# Patient Record
Sex: FEMALE | Race: WHITE | NOT HISPANIC OR LATINO | Employment: OTHER | URBAN - METROPOLITAN AREA
[De-identification: names, ages, dates, MRNs, and addresses within clinical notes are randomized per-mention and may not be internally consistent; named-entity substitution may affect disease eponyms.]

---

## 2017-08-14 ENCOUNTER — TELEPHONE (OUTPATIENT)
Dept: ORTHOPEDICS | Facility: CLINIC | Age: 62
End: 2017-08-14

## 2017-08-14 NOTE — TELEPHONE ENCOUNTER
----- Message from Jacky Baker MD sent at 8/14/2017  8:33 AM CDT -----  Contact: self   I will be happy to see her.    ----- Message -----  From: Frieda Pederson MA  Sent: 8/14/2017   8:21 AM  To: Jacky Baker MD        ----- Message -----  From: Beena Messina MA  Sent: 8/14/2017   8:13 AM  To: Olivia RAMOS Staff, Bryson AYON Staff, #    Pt recentlt had sx back in may of this year on her hip. Her surgeon in [place] has told her that she will need another sx to shorten her leg as a corrective procedure.  Her surgeon has never done the procedure before that is why the pt is seeking out a Dr that has done this procedure. Pt can be reached at [phone].

## 2017-09-12 ENCOUNTER — TELEPHONE (OUTPATIENT)
Dept: ORTHOPEDICS | Facility: CLINIC | Age: 62
End: 2017-09-12

## 2017-09-12 DIAGNOSIS — M25.552 PAIN OF LEFT HIP JOINT: Primary | ICD-10-CM

## 2017-09-13 ENCOUNTER — HOSPITAL ENCOUNTER (OUTPATIENT)
Dept: RADIOLOGY | Facility: HOSPITAL | Age: 62
Discharge: HOME | End: 2017-09-13
Attending: ORTHOPAEDIC SURGERY
Payer: COMMERCIAL

## 2017-09-13 ENCOUNTER — OFFICE VISIT (OUTPATIENT)
Dept: ORTHOPEDICS | Facility: CLINIC | Age: 62
End: 2017-09-13
Payer: COMMERCIAL

## 2017-09-13 VITALS — BODY MASS INDEX: 29.67 KG/M2 | WEIGHT: 173.81 LBS | HEIGHT: 64 IN

## 2017-09-13 DIAGNOSIS — Z96.642 HISTORY OF LEFT HIP REPLACEMENT: ICD-10-CM

## 2017-09-13 DIAGNOSIS — M21.70 ACQUIRED UNEQUAL LIMB LENGTH: ICD-10-CM

## 2017-09-13 DIAGNOSIS — M25.552 PAIN OF LEFT HIP JOINT: Primary | ICD-10-CM

## 2017-09-13 DIAGNOSIS — M25.552 PAIN OF LEFT HIP JOINT: ICD-10-CM

## 2017-09-13 PROCEDURE — 3008F BODY MASS INDEX DOCD: CPT | Mod: S$GLB,,, | Performed by: ORTHOPAEDIC SURGERY

## 2017-09-13 PROCEDURE — 99203 OFFICE O/P NEW LOW 30 MIN: CPT | Mod: S$GLB,,, | Performed by: ORTHOPAEDIC SURGERY

## 2017-09-13 PROCEDURE — 73502 X-RAY EXAM HIP UNI 2-3 VIEWS: CPT | Mod: 26,LT,, | Performed by: RADIOLOGY

## 2017-09-13 PROCEDURE — 99999 PR PBB SHADOW E&M-EST. PATIENT-LVL III: CPT | Mod: PBBFAC,,, | Performed by: ORTHOPAEDIC SURGERY

## 2017-09-13 PROCEDURE — 73502 X-RAY EXAM HIP UNI 2-3 VIEWS: CPT | Mod: TC,LT

## 2017-09-13 RX ORDER — TRAMADOL HYDROCHLORIDE 50 MG/1
100 TABLET ORAL
COMMUNITY
Start: 2017-05-26 | End: 2017-10-30 | Stop reason: ALTCHOICE

## 2017-09-13 RX ORDER — ACETAMINOPHEN 500 MG
500 TABLET ORAL
COMMUNITY
End: 2018-03-19

## 2017-09-13 NOTE — PROGRESS NOTES
Subjective:      Patient ID: Monet Lewis is a 62 y.o. female.    Chief Complaint: Pain of the Left Knee and Pain of the Left Hip    HPI  Monet Lewis is a 62 year old female here with a several  month history of left hip pain. The patient is retired teacher. There was not a history of trauma.  She had a left NAYANA done in [place] in May (I reviewed the op note) The pain is moderate.  The pain is located in the lowerback, radiating down to her knee.  Groin pain.   The pain is described as achy.  It is aggravated by standing and walking.  It is alleviated by rest. There is not numbness or tingling of the lower extremity. There was not problems with wound healing or infection.  The patient has not had an infection work up.  The patient  has not tried medications or injections. The patient does have difficulty getting in or out of a car, getting dressed, or going up or down stairs.  SHe uses a walker    Past Medical History:   Diagnosis Date    Arthritis     Hypertension     Myocardial infarction      Past Surgical History:   Procedure Laterality Date    CARDIAC SURGERY      HIP SURGERY       History reviewed. No pertinent family history.  Social History     Social History    Marital status:      Spouse name: N/A    Number of children: N/A    Years of education: N/A     Occupational History    Not on file.     Social History Main Topics    Smoking status: Never Smoker    Smokeless tobacco: Never Used    Alcohol use No    Drug use: No    Sexual activity: Not on file     Other Topics Concern    Not on file     Social History Narrative    No narrative on file     Current Outpatient Prescriptions on File Prior to Visit   Medication Sig Dispense Refill    aspirin (ECOTRIN) 325 MG EC tablet Take 325 mg by mouth.      nitrofurantoin (MACRODANTIN) 50 MG capsule TK 1 C PO QHS WITH FOOD      BYSTOLIC 5 mg Tab TK 1 T PO  QD FOR BP  1    calcium carbonate (OS-PURNIMA) 600 mg (1,500 mg) Tab Take 600 mg by mouth.  "     ergocalciferol, vitamin D2, Powd Take 800 Units by mouth.       No current facility-administered medications on file prior to visit.      Review of patient's allergies indicates:  No Known Allergies    Review of Systems   Constitution: Negative for chills, fever and night sweats.   HENT: Negative for hearing loss.    Eyes: Negative for blurred vision and double vision.   Cardiovascular: Negative for chest pain, claudication and leg swelling.   Respiratory: Negative for shortness of breath.    Endocrine: Negative for polydipsia, polyphagia and polyuria.   Hematologic/Lymphatic: Negative for adenopathy and bleeding problem. Does not bruise/bleed easily.   Skin: Negative for poor wound healing.   Gastrointestinal: Negative for diarrhea and heartburn.   Genitourinary: Negative for bladder incontinence.   Neurological: Negative for focal weakness, headaches, numbness, paresthesias and sensory change.   Psychiatric/Behavioral: The patient is not nervous/anxious.    Allergic/Immunologic: Negative for persistent infections.         Objective:      Body mass index is 29.84 kg/m².  Vitals:    09/13/17 1107   Weight: 78.8 kg (173 lb 13.3 oz)   Height: 5' 4" (1.626 m)         General    Constitutional: She is oriented to person, place, and time. She appears well-developed and well-nourished.   HENT:   Head: Normocephalic and atraumatic.   Eyes: EOM are normal.   Cardiovascular: Normal rate.    Pulmonary/Chest: Effort normal.   Neurological: She is alert and oriented to person, place, and time.   Psychiatric: She has a normal mood and affect. Her behavior is normal.     General Musculoskeletal Exam   Gait: abnormal, antalgic and short leg   Pelvic Obliquity: severe      Right Knee Exam     Inspection   Alignment:  normal  Effusion: effusion    Left Knee Exam     Inspection   Alignment:  normal  Effusion: absent    Right Hip Exam     Inspection   Scars: absent  Swelling: absent  Bruising: absent  No deformity of " hip.  Quadriceps Atrophy:  Negative  Erythema: absent    Range of Motion   Extension: 0   Flexion: 100   Internal Rotation: 25   External Rotation: 30   Abduction: 25   Adduction: 20     Tests   Pain w/ forced internal rotation (JUDAH): absent  Stinchfield test: negative    Other   Sensation: normal  Left Hip Exam     Inspection   Scars: present  Swelling: absent  No deformity of hip.  Quadriceps Atrophy:  negative  Erythema: absent  Bruising: absent    Range of Motion   Extension: 0   Flexion: 100   Internal Rotation: 15   External Rotation: 20   Abduction: 10   Adduction: 10     Tests   Pain w/ forced internal rotation (JUDAH): present  Stinchfield test: positive    Other   Sensation: normal      Back (L-Spine & T-Spine) / Neck (C-Spine) Exam     Back (L-Spine & T-Spine) Range of Motion   The patient has abnormal back ROM.      Muscle Strength   Right Lower Extremity   Hip Abduction: 5/5   Hip Adduction: 5/5   Hip Flexion: 5/5   Ankle Dorsiflexion:  5/5   Left Lower Extremity   Hip Abduction: 5/5   Hip Adduction: 5/5   Hip Flexion: 5/5   Ankle Dorsiflexion:  5/5     Reflexes     Left Side  Quadriceps:  2+    Right Side   Quadriceps:  2+    Vascular Exam     Right Pulses  Dorsalis Pedis:      2+          Left Pulses  Dorsalis Pedis:      2+          Capillary Refill  Right Hand: normal capillary refill  Left Hand: normal capillary refill    Edema  Right Upper Leg: absent  Left Upper Leg: absent      Radiographs taken today were reviewed by me.  There is a prosthetic replacement of the left hip(s).  Significant pelvic obliquity.  Left hip prosthesis with out signs of loosening.  About 1.5 cm long neased on xray      Assessment:       Encounter Diagnoses   Name Primary?    Pain of left hip joint Yes    History of left hip replacement     Acquired unequal limb length           Plan:       Monet was seen today for pain and pain.    Diagnoses and all orders for this visit:    Pain of left hip joint  -     C-reactive  protein; Future  -     Sedimentation rate, manual; Future    History of left hip replacement  -     C-reactive protein; Future  -     Sedimentation rate, manual; Future    Acquired unequal limb length  -     C-reactive protein; Future  -     Sedimentation rate, manual; Future      Discussed at length.  Need to r/o infection secondary to significant pain.  ESR/CRP.  Will call

## 2017-09-14 ENCOUNTER — TELEPHONE (OUTPATIENT)
Dept: ORTHOPEDICS | Facility: CLINIC | Age: 62
End: 2017-09-14

## 2017-09-14 NOTE — TELEPHONE ENCOUNTER
----- Message from Jacky Baker MD sent at 9/13/2017  5:18 PM CDT -----  Please call pt.  Labs look good.  No infection.  CT scan of hip in the AM.  See me later that day.  She will also need Standing AP Pelvis and lumbar spine films pror to seeing me.  CT scan ordered.

## 2017-09-14 NOTE — TELEPHONE ENCOUNTER
Spoke to pt and she was notified of her results and she understood. Pt was given a follow up appointment for the ct scan,xray and with Dr. Baker. Pt was pleased. The appointment slip was mailed to pt home.

## 2017-09-20 ENCOUNTER — HOSPITAL ENCOUNTER (OUTPATIENT)
Dept: RADIOLOGY | Facility: HOSPITAL | Age: 62
Discharge: HOME | End: 2017-09-20
Attending: ORTHOPAEDIC SURGERY
Payer: COMMERCIAL

## 2017-09-20 ENCOUNTER — OFFICE VISIT (OUTPATIENT)
Dept: ORTHOPEDICS | Facility: CLINIC | Age: 62
End: 2017-09-20
Payer: COMMERCIAL

## 2017-09-20 VITALS — BODY MASS INDEX: 29.55 KG/M2 | HEIGHT: 64 IN | WEIGHT: 173.06 LBS

## 2017-09-20 DIAGNOSIS — M21.70 ACQUIRED UNEQUAL LIMB LENGTH: ICD-10-CM

## 2017-09-20 DIAGNOSIS — M25.552 PAIN OF LEFT HIP JOINT: Primary | ICD-10-CM

## 2017-09-20 DIAGNOSIS — Z96.642 HISTORY OF LEFT HIP REPLACEMENT: ICD-10-CM

## 2017-09-20 DIAGNOSIS — M25.552 PAIN OF LEFT HIP JOINT: ICD-10-CM

## 2017-09-20 PROCEDURE — 72100 X-RAY EXAM L-S SPINE 2/3 VWS: CPT | Mod: TC

## 2017-09-20 PROCEDURE — 3008F BODY MASS INDEX DOCD: CPT | Mod: S$GLB,,, | Performed by: ORTHOPAEDIC SURGERY

## 2017-09-20 PROCEDURE — 72170 X-RAY EXAM OF PELVIS: CPT | Mod: TC

## 2017-09-20 PROCEDURE — 73700 CT LOWER EXTREMITY W/O DYE: CPT | Mod: 26,LT,, | Performed by: RADIOLOGY

## 2017-09-20 PROCEDURE — 72170 X-RAY EXAM OF PELVIS: CPT | Mod: 26,,, | Performed by: RADIOLOGY

## 2017-09-20 PROCEDURE — 73700 CT LOWER EXTREMITY W/O DYE: CPT | Mod: TC,LT

## 2017-09-20 PROCEDURE — 99999 PR PBB SHADOW E&M-EST. PATIENT-LVL III: CPT | Mod: PBBFAC,,, | Performed by: ORTHOPAEDIC SURGERY

## 2017-09-20 PROCEDURE — 72100 X-RAY EXAM L-S SPINE 2/3 VWS: CPT | Mod: 26,,, | Performed by: RADIOLOGY

## 2017-09-20 PROCEDURE — 99213 OFFICE O/P EST LOW 20 MIN: CPT | Mod: S$GLB,,, | Performed by: ORTHOPAEDIC SURGERY

## 2017-09-20 NOTE — PROGRESS NOTES
"Subjective:      Patient ID: Monet Lewis is a 62 y.o. female.    Chief Complaint: Pain of the Left Hip    HPI  Monet Lewis has left hip pain and lower extremity pain.  The pain has improved. Marianne is having much less pain, but her leg length is still off, with the left being much longer.  This is severely impacting her activities of daily living  Review of Systems   Constitution: Negative for chills, fever and night sweats.   HENT: Negative for hearing loss.    Eyes: Negative for blurred vision and double vision.   Cardiovascular: Negative for chest pain, claudication and leg swelling.   Respiratory: Negative for shortness of breath.    Endocrine: Negative for polydipsia, polyphagia and polyuria.   Hematologic/Lymphatic: Negative for adenopathy and bleeding problem. Does not bruise/bleed easily.   Skin: Negative for poor wound healing.   Gastrointestinal: Negative for diarrhea and heartburn.   Genitourinary: Negative for bladder incontinence.   Neurological: Negative for focal weakness, headaches, numbness, paresthesias and sensory change.   Psychiatric/Behavioral: The patient is not nervous/anxious.    Allergic/Immunologic: Negative for persistent infections.         Objective:      Body mass index is 29.71 kg/m².  Vitals:    09/20/17 1050   Weight: 78.5 kg (173 lb 1 oz)   Height: 5' 4" (1.626 m)           General    Constitutional: She is oriented to person, place, and time. She appears well-developed and well-nourished.   HENT:   Head: Normocephalic and atraumatic.   Eyes: EOM are normal.   Cardiovascular: Normal rate.    Pulmonary/Chest: Effort normal.   Neurological: She is alert and oriented to person, place, and time.   Psychiatric: She has a normal mood and affect. Her behavior is normal.     General Musculoskeletal Exam   Gait: abnormal and antalgic   Pelvic Obliquity: moderate      Right Knee Exam     Inspection   Alignment:  normal  Effusion: effusion    Left Knee Exam     Inspection   Alignment:  " normal  Effusion: absent    Right Hip Exam     Inspection   Scars: absent  Swelling: absent  Bruising: absent  No deformity of hip.  Quadriceps Atrophy:  Negative  Erythema: absent    Range of Motion   Extension: 0   Flexion: 100   Internal Rotation: 25   External Rotation: 30   Abduction: 25   Adduction: 20     Tests   Pain w/ forced internal rotation (JUDAH): absent  Stinchfield test: negative    Other   Sensation: normal  Left Hip Exam     Inspection   Scars: present  Swelling: absent  No deformity of hip.  Quadriceps Atrophy:  negative  Erythema: absent  Bruising: absent    Range of Motion   Extension: 0   Flexion: 90   Internal Rotation: 20   External Rotation: 20   Abduction: 25   Adduction: 20     Tests   Pain w/ forced internal rotation (JUDAH): absent  Stinchfield test: negative    Other   Sensation: normal    Comments:  Pelvic obliquity improved but there is still a significant LLD      Back (L-Spine & T-Spine) / Neck (C-Spine) Exam     Back (L-Spine & T-Spine) Range of Motion   The patient has abnormal back ROM.      Muscle Strength   Right Lower Extremity   Hip Abduction: 5/5   Hip Adduction: 5/5   Hip Flexion: 5/5   Ankle Dorsiflexion:  5/5   Left Lower Extremity   Hip Abduction: 5/5   Hip Adduction: 5/5   Hip Flexion: 5/5   Ankle Dorsiflexion:  5/5     Reflexes     Left Side  Quadriceps:  2+    Right Side   Quadriceps:  2+    Vascular Exam     Right Pulses  Dorsalis Pedis:      2+          Left Pulses  Dorsalis Pedis:      2+          Capillary Refill  Right Hand: normal capillary refill  Left Hand: normal capillary refill    Edema  Right Upper Leg: absent  Left Upper Leg: absent    I reviewed CT scan and peliv and lumbar fims taken today.  S/P Left NAYANA with LLD.          Assessment:       Encounter Diagnoses   Name Primary?    Pain of left hip joint Yes    History of left hip replacement     Acquired unequal limb length           Plan:       Monet was seen today for pain.    Diagnoses and all  orders for this visit:    Pain of left hip joint    History of left hip replacement    Acquired unequal limb length      Treatment options were discussed. The surgical process of revision left  hip replacement was discussed in detail with Monet Lewis   including a detailed discussion of the procedure itself including bearing options and prognosis. The typical perioperative and post-operative course was discussed and perioperative risks were discussed to the patient's satisfaction.  Risks and complications discussed included but were not limited to the risks of anesthetic complications, infection, fracture, bleeding, wound healing complications, further surgery,  aseptic loosening, instability, limb length inequality, neurologic dysfunction including numbness and wesakness,  DVT, pulmonary embolism, perioperative medical risks (cardiac, pulmonary, renal, neurologic), and death and the patient elects to proceed. The patient is aware of the increased risk of complications with revision surgery.  The patient should get medically cleared.  ASA for DVT prophylaxis    We will remove  femoral component and shorten neck cut.  Probably convert to MDM.  Aware of risk of dislocation post-op and chance that leg may still be sightly longer.

## 2017-09-21 ENCOUNTER — TELEPHONE (OUTPATIENT)
Dept: ORTHOPEDICS | Facility: CLINIC | Age: 62
End: 2017-09-21

## 2017-09-21 DIAGNOSIS — M25.552 PAIN OF LEFT HIP: Primary | ICD-10-CM

## 2017-09-21 DIAGNOSIS — M21.70 ACQUIRED UNEQUAL LIMB LENGTH: ICD-10-CM

## 2017-09-21 DIAGNOSIS — Z96.642 HISTORY OF LEFT HIP REPLACEMENT: ICD-10-CM

## 2017-09-21 NOTE — TELEPHONE ENCOUNTER
Left message stating dates and times of pre-op/post-op appointments/provided name and number for questions/concerns. Appointment slips mailed.

## 2017-09-21 NOTE — TELEPHONE ENCOUNTER
----- Message from Julito Hernandez sent at 9/21/2017  8:48 AM CDT -----  Contact: self/home  Pt was returning Heathers call.

## 2017-09-26 ENCOUNTER — TELEPHONE (OUTPATIENT)
Dept: ORTHOPEDICS | Facility: CLINIC | Age: 62
End: 2017-09-26

## 2017-09-26 NOTE — TELEPHONE ENCOUNTER
Spoke to pt, pt states she needs last notes faxed to [facility] in [place]. For insurance approval stating they need more documentation for the PT. Informed pt I will fax last note to [facility]. Understanding verbalized.

## 2017-09-26 NOTE — TELEPHONE ENCOUNTER
----- Message from Ya Carranza MA sent at 9/26/2017  9:45 AM CDT -----  Contact: self@home, [phone]      ----- Message -----  From: Jona Huerta  Sent: 9/26/2017   9:22 AM  To: Olivia RAMOS Staff    Pt called in asking to speak with Yessenia regarding her PT orders. Pt is asking to please call her back    Thank you

## 2017-09-29 ENCOUNTER — PATIENT MESSAGE (OUTPATIENT)
Dept: SURGERY | Facility: HOSPITAL | Age: 62
End: 2017-09-29

## 2017-10-05 ENCOUNTER — PATIENT MESSAGE (OUTPATIENT)
Dept: ORTHOPEDICS | Facility: CLINIC | Age: 62
End: 2017-10-05

## 2017-10-13 ENCOUNTER — PATIENT MESSAGE (OUTPATIENT)
Dept: SURGERY | Facility: HOSPITAL | Age: 62
End: 2017-10-13

## 2017-10-18 ENCOUNTER — ANESTHESIA EVENT (OUTPATIENT)
Dept: SURGERY | Facility: HOSPITAL | Age: 62
DRG: 468 | End: 2017-10-18
Payer: COMMERCIAL

## 2017-10-18 DIAGNOSIS — M79.605 PAIN OF LEFT LOWER EXTREMITY: Primary | ICD-10-CM

## 2017-10-18 NOTE — ANESTHESIA PREPROCEDURE EVALUATION
Anesthesia Assessment: Preoperative EQUATION    Planned Procedure: Procedure(s) (LRB):  REVISION-ARTHROPLASTY-HIP-TOTAL (Left)  Requested Anesthesia Type:Spinal/Epidural  Surgeon: Jacky Baker MD  Service: Orthopedics  Known or anticipated Date of Surgery:11/7/2017    Optimization:  Anesthesia Preop Clinic Assessment  Indicated    Medical Opinion Indicated  DR FISH           Plan:    Testing:  CBC, CMP, PT/INR, T&S and T&C   Pre-anesthesia  visit       Visit focus: possible regional anesthesia and/or nerve block      Consultation:IM Perioperative Hospitalist      Navigation: Tests Scheduled.              Consults scheduled.             Results will be tracked by Preop Clinic.     CHUCKY QUINTANILLA 10/18/17                                                                                                         10/18/2017  Monet Lewis is a 62 y.o., female.    Patient Active Problem List   Diagnosis    Essential hypertension    Hx of prior ablation treatment    Coronary artery disease involving native coronary artery of native heart without angina pectoris    Recurrent UTI    Hypoxia    Edema    Left hip pain     No current facility-administered medications on file prior to encounter.      Current Outpatient Prescriptions on File Prior to Encounter   Medication Sig Dispense Refill    acetaminophen (TYLENOL) 500 MG tablet Take 500 mg by mouth as needed.       aspirin (ECOTRIN) 325 MG EC tablet Take 325 mg by mouth every evening.       BYSTOLIC 5 mg Tab taking 2.5mg every hs  1    calcium carbonate (OS-PURNIMA) 600 mg (1,500 mg) Tab Take 600 mg by mouth every 30 days.       calcium carbonate-vitamin D3 (CALCIUM 600 + D,3,) 600 mg calcium- 200 unit Cap Take 600 mg by mouth 2 (two) times daily.      Review of patient's allergies indicates:  No Known Allergies          Anesthesia Evaluation    I have reviewed the Patient Summary Reports.    I have reviewed the Nursing Notes.   I have reviewed the  Medications.     Review of Systems  Anesthesia Hx:  No problems with previous Anesthesia Hx of congenital hip disease s/p surgery at age 4. Sx few months ago and now with significant leg length descrepancy   Social:  Non-Smoker, No Alcohol Use    Hematology/Oncology:  Hematology Normal       -- Cancer in past history (SCC):    EENT/Dental:EENT/Dental Normal   Cardiovascular:   Hypertension Past MI (x2. Last 5 years ago.   Patient reports LHC does not indiate any blockage.  Not followed by a cardiologist but had stress test a few months ago.  Will try to obtain) CAD   Dysrhythmias (s/p ablation) atrial fibrillation    Pulmonary:  Pulmonary Normal    Renal/:   Current UTI on augmentin and will f/u with flagyl after.   Hepatic/GI:   Denies GERD. Denies Liver Disease.    Neurological:   Denies CVA. Denies Seizures.    Endocrine:   Hx of parathyroid sx about 5 years ago   Dermatological:  Skin Normal    Psych:  Psychiatric Normal           Physical Exam  General:  Well nourished    Airway/Jaw/Neck:  Airway Findings: Mouth Opening: Normal Tongue: Normal  General Airway Assessment: Adult  Mallampati: II  Improves to II with phonation.  TM Distance: Normal, at least 6 cm  Jaw/Neck Findings:  Neck ROM: Normal ROM      Dental:  Dental Findings: molar caps        Mental Status:  Mental Status Findings:  Cooperative, Alert and Oriented             Labs pending    Will request a copy of a stress test    Please refer to , Internal Medicine, perioperative risk assessment and recommendations.     Damari Osman RN 10/30/2017            Damari Osman RN 10/30/2017        Anesthesia Plan  Type of Anesthesia, risks & benefits discussed:  Anesthesia Type:  general, epidural, CSE  Patient's Preference:   Intra-op Monitoring Plan: standard ASA monitors  Intra-op Monitoring Plan Comments:   Post Op Pain Control Plan:   Post Op Pain Control Plan Comments:   Induction:   IV  Beta Blocker:  Patient is not currently on a  Beta-Blocker (No further documentation required).       Informed Consent: Patient understands risks and agrees with Anesthesia plan.  Questions answered. Anesthesia consent signed with patient.  ASA Score: 3     Day of Surgery Review of History & Physical:    H&P update referred to the surgeon.         Ready For Surgery From Anesthesia Perspective.

## 2017-10-18 NOTE — PRE ADMISSION SCREENING
Anesthesia Assessment: Preoperative EQUATION    Planned Procedure: Procedure(s) (LRB):  REVISION-ARTHROPLASTY-HIP-TOTAL (Left)  Requested Anesthesia Type:Spinal/Epidural  Surgeon: Jacky Baker MD  Service: Orthopedics  Known or anticipated Date of Surgery:11/7/2017    Optimization:  Anesthesia Preop Clinic Assessment  Indicated    Medical Opinion Indicated  DR FISH           Plan:    Testing:  CBC, CMP, PT/INR, T&S and T&C   Pre-anesthesia  visit       Visit focus: possible regional anesthesia and/or nerve block      Consultation:IM Perioperative Hospitalist      Navigation: Tests Scheduled.              Consults scheduled.             Results will be tracked by Preop Clinic.

## 2017-10-30 ENCOUNTER — HOSPITAL ENCOUNTER (OUTPATIENT)
Dept: RADIOLOGY | Facility: HOSPITAL | Age: 62
Discharge: HOME | End: 2017-10-30
Attending: PHYSICIAN ASSISTANT
Payer: COMMERCIAL

## 2017-10-30 ENCOUNTER — INITIAL CONSULT (OUTPATIENT)
Dept: INTERNAL MEDICINE | Facility: CLINIC | Age: 62
End: 2017-10-30
Payer: COMMERCIAL

## 2017-10-30 ENCOUNTER — PATIENT MESSAGE (OUTPATIENT)
Dept: ORTHOPEDICS | Facility: CLINIC | Age: 62
End: 2017-10-30

## 2017-10-30 ENCOUNTER — HOSPITAL ENCOUNTER (OUTPATIENT)
Dept: PREADMISSION TESTING | Facility: HOSPITAL | Age: 62
Discharge: HOME | End: 2017-10-30
Attending: ANESTHESIOLOGY
Payer: COMMERCIAL

## 2017-10-30 ENCOUNTER — OFFICE VISIT (OUTPATIENT)
Dept: ORTHOPEDICS | Facility: CLINIC | Age: 62
End: 2017-10-30
Payer: COMMERCIAL

## 2017-10-30 VITALS — WEIGHT: 173 LBS | BODY MASS INDEX: 29.53 KG/M2 | HEIGHT: 64 IN

## 2017-10-30 VITALS
TEMPERATURE: 98 F | HEIGHT: 64 IN | BODY MASS INDEX: 29.64 KG/M2 | OXYGEN SATURATION: 97 % | WEIGHT: 173.63 LBS | SYSTOLIC BLOOD PRESSURE: 129 MMHG | HEART RATE: 71 BPM | DIASTOLIC BLOOD PRESSURE: 80 MMHG

## 2017-10-30 DIAGNOSIS — Z96.642 HISTORY OF LEFT HIP REPLACEMENT: ICD-10-CM

## 2017-10-30 DIAGNOSIS — I25.10 CORONARY ARTERY DISEASE INVOLVING NATIVE CORONARY ARTERY OF NATIVE HEART WITHOUT ANGINA PECTORIS: ICD-10-CM

## 2017-10-30 DIAGNOSIS — N39.0 RECURRENT UTI: ICD-10-CM

## 2017-10-30 DIAGNOSIS — M21.70 ACQUIRED UNEQUAL LIMB LENGTH: ICD-10-CM

## 2017-10-30 DIAGNOSIS — G89.29 CHRONIC PAIN OF LEFT KNEE: ICD-10-CM

## 2017-10-30 DIAGNOSIS — M25.552 LEFT HIP PAIN: ICD-10-CM

## 2017-10-30 DIAGNOSIS — R09.02 HYPOXIA: ICD-10-CM

## 2017-10-30 DIAGNOSIS — R60.9 EDEMA, UNSPECIFIED TYPE: ICD-10-CM

## 2017-10-30 DIAGNOSIS — M25.552 LEFT HIP PAIN: Primary | ICD-10-CM

## 2017-10-30 DIAGNOSIS — I10 ESSENTIAL HYPERTENSION: ICD-10-CM

## 2017-10-30 DIAGNOSIS — Z98.890 HX OF PRIOR ABLATION TREATMENT: ICD-10-CM

## 2017-10-30 DIAGNOSIS — M25.562 CHRONIC PAIN OF LEFT KNEE: ICD-10-CM

## 2017-10-30 PROCEDURE — 73562 X-RAY EXAM OF KNEE 3: CPT | Mod: 26,59,RT, | Performed by: RADIOLOGY

## 2017-10-30 PROCEDURE — 99999 PR PBB SHADOW E&M-EST. PATIENT-LVL III: CPT | Mod: PBBFAC,,, | Performed by: PHYSICIAN ASSISTANT

## 2017-10-30 PROCEDURE — 73564 X-RAY EXAM KNEE 4 OR MORE: CPT | Mod: 26,LT,, | Performed by: RADIOLOGY

## 2017-10-30 PROCEDURE — 99999 PR PBB SHADOW E&M-EST. PATIENT-LVL III: CPT | Mod: PBBFAC,,, | Performed by: HOSPITALIST

## 2017-10-30 PROCEDURE — 99499 UNLISTED E&M SERVICE: CPT | Mod: S$GLB,,, | Performed by: PHYSICIAN ASSISTANT

## 2017-10-30 PROCEDURE — 99244 OFF/OP CNSLTJ NEW/EST MOD 40: CPT | Mod: S$GLB,,, | Performed by: HOSPITALIST

## 2017-10-30 PROCEDURE — 73502 X-RAY EXAM HIP UNI 2-3 VIEWS: CPT | Mod: 26,LT,, | Performed by: RADIOLOGY

## 2017-10-30 PROCEDURE — 73564 X-RAY EXAM KNEE 4 OR MORE: CPT | Mod: TC,LT

## 2017-10-30 PROCEDURE — 73502 X-RAY EXAM HIP UNI 2-3 VIEWS: CPT | Mod: TC,LT

## 2017-10-30 RX ORDER — AMOXICILLIN AND CLAVULANATE POTASSIUM 500; 125 MG/1; MG/1
1 TABLET, FILM COATED ORAL
COMMUNITY
End: 2017-11-06

## 2017-10-30 RX ORDER — ZOLPIDEM TARTRATE 10 MG/1
5 TABLET ORAL NIGHTLY PRN
COMMUNITY

## 2017-10-30 RX ORDER — FLUCONAZOLE 150 MG/1
150 TABLET ORAL ONCE
COMMUNITY
End: 2018-03-19

## 2017-10-30 NOTE — ASSESSMENT & PLAN NOTE
Nayely no narrowings to her knowledge around 2007   Most recent stress test about 6 months , no problem to her understanding

## 2017-10-30 NOTE — DISCHARGE INSTRUCTIONS
Your surgery has been scheduled for:__________________________________________    You should report to:  ____[facility], located on the [place] side of the first floor of the           [facility] ([phone])  ____[facility], located on the [facility] side of the            Second floor of the [facility] ([phone])  ____3rd Floor [facility] located on the [facility] side of the [facility] [phone]  Please Note   - Tell your doctor if you take Aspirin, products containing Aspirin, herbal medications  or blood thinners, such as Coumadin, Ticlid, or Plavix.  (Consult your provider regarding holding or stopping before surgery).  - Arrange for someone to drive you home following surgery.  You will not be allowed to leave the surgical facility alone or drive yourself home following sedation and anesthesia.  Before Surgery  - Stop taking all herbal medications 14days prior to surgery  - No Motrin/Advil (Ibuprofen) 7 days before surgery  - No Aleve (Naproxen) 7 days before surgery  - Stop Taking Asprin, products containing Asprin _____days before surgery  - Stop taking blood thinners_______days before surgery  - Refrain from drinking alcoholic beverages for 24hours before and after surgery  - Stop or limit smoking _________days before surgery  Night before Surgery  - DO NOT EAT OR DRINK ANYTHING AFTER MIDNIGHT, INCLUDING GUM, HARD CANDY, MINTS, OR CHEWING TOBACCO.  - Take a shower or bath (shower is recommended).  Bathe with Hibiclens soap or an antibacterial soap from the neck down.  If not supplied by your surgeon, hibiclens soap will need to be purchased over the counter in pharmacy.  Rinse soap off thoroughly.  - Shampoo your hair with your regular shampoo  The Day of Surgery  - Take another bath or shower with hibiclens or any antibacterial soap, to reduce the chance of infection.  - Take heart and blood  pressure medications with a small sip of water, as advised by the perioperative team.  - Do not take fluid pills  - You may brush your teeth and rinse your mouth, but do not swall any additional water.   - Do not apply perfumes, powder, body lotions or deodorant on the day of surgery.  - Nail polish should be removed.  - Do not wear makeup or moisturizer  - Wear comfortable clothes, such as a button front shirt and loose fitting pants.  - Leave all jewelry, including body piercings, and valuables at home.    - Bring any devices you will neeed after surgery such as crutches or canes.  - If you have sleep apnea, please bring your CPAP machine  In the event that your physical condition changes including the onset of a cold or respiratory illness, or if you have to delay or cancel your surgery, please notify your surgeon.Anesthesia: Regional Anesthesia  Youre scheduled for surgery. During surgery, youll receive medication called anesthesia to keep you comfortable and pain-free. Your surgeon has decided that youll receive regional anesthesia. This sheet tells you what to expect with this type of anesthesia.  What Is Regional Anesthesia?  Regional anesthesia numbs one region of your body. The anesthesia may be given around nerves or into veins in your arms, neck, or legs (nerve block or [place] block). Or it may be sent into the spinal fluid (spinal anesthesia) or into the space just outside the spinal fluid (epidural anesthesia). Sedatives may also be given to relax you.  Nerve Block or [place] Block  A small area of the body, such as an arm or leg, can be numbed using a nerve block or [place] block.  · Nerve block: During a nerve block, your skin is numbed. A needle is then inserted near nerves that serve the area to be numbed. Anesthetic is sent through the needle.  · IV regional or [place] block: For this type of block, an IV line is put into a vein. The blood flow to the area to be numbed is blocked for a short time.  Anesthetic is sent through the IV.  Spinal Anesthesia  Spinal anesthesia numbs your body from about the waist down.  · Anesthetic is injected into the spinal fluid. This is a substance that surrounds the spinal cord in your spinal column. The anesthetic blocks pain traveling from the body to the brain.  · To receive the anesthetic, your skin is numbed at the injection site.  · A needle is then inserted into the spinal fluid. Anesthetic is sent through the needle.  Anesthesia Tools and Medications  · Local anesthetic given through a needle numbs one region of your body.  · Electrocardiography leads (electrodes) record your heart rate and rhythm.  · A blood pressure cuff monitors your blood pressure.  · A pulse oximeter on the end of the finger measures your blood oxygen level.  · Sedatives may be given through an IV to relax you and keep you comfortable. You may stay awake or sleep slightly.  · Oxygen may be given to you through a facemask.  Risks and Possible Complications  Regional anesthesia carries some risks. These include:  · Nausea and vomiting  · Headache  · Backache  · Decreased blood pressure  · Allergic reaction to the anesthetic  · Ongoing numbness (rare)  · Irregular heartbeat (rare)  · Cardiac arrest (rare)   © 9713-9530 [facility], [address] [place] [address], [place] [address]. All rights reserved. This information is not intended as a substitute for professional medical care. Always follow your healthcare professional's instructions.

## 2017-10-30 NOTE — ASSESSMENT & PLAN NOTE
Edema- I suggested avoidance of added salt,avoidance of NSAID's ( except ASA 81 mg ) and suggested Limb elevation and vira amberly use  Suggested discussing with PCP about ASA dose for the long term ( for some reasons she is on  mg daily )

## 2017-10-30 NOTE — ASSESSMENT & PLAN NOTE
With hip surgery about 6 months  Was on Opioid, Ambien   Seems to be opioid sensitive     Possible sleep apnea- snoring with Ambien- I suggest a sleep study and suggest caution with usage of medication that can cause respiratory suppression in the perioperative period  potential ramifications of untreated sleep apnea, which could include daytime sleepiness, hypertension, heart disease and stroke were discussed

## 2017-10-30 NOTE — HPI
History of present illness- I had the pleasure of meeting this pleasant 62 y.o. lady in the pre op clinic prior to her elective Orthopedic surgery. The patient is new to me . Monet was accompanied by  Jacky.    I have obtained the history by speaking to the patient and by reviewing the electronic health records.    Events leading up to surgery / History of presenting illness -    She has been troubled with  mild- moderate- severe Left hip  pain for 2 years  . Pain increases with activity and decreases with resting.     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)  and at the age of 4 years , was operated   Had Hip replacement 6 months ago and has problem with length  Having pain in the Left knee also , grinds as she walks  Uses a shoe lift on the Rt compensate to compensate for length discrepancy    Hypertension ,On medication  Home  machine readings -  140/80    History of recurrent UTI's   Had urologist evaluation ( 3 providers ) multiple times   Had Cystoscopy   Gets UTI's once in 2 months  Was on preventive medication which did not help and lately on treatment when has UTI  On Treatment now for UTI     History of kidney stones -Left  Had parathyroid surgery about 4 years ago and since then had not passed any kidney stones      Coronary artery disease -  History of MI- 2 times  Last 5 years ago, first 10 years ago   Had heart cath with the first one-no blockages - unsure why she had MI  No History of CABG.No History of stenting  Ablation done about 5 years ago  MI symptoms- Rt arm pain , moving across the chest   Second time , had neck pain   No other radiation   Associated with nausea. 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

## 2017-10-30 NOTE — LETTER
October 30, 2017      Rhonda G Leopold, MD  [address]  [place] [address]           [facility] - Pre Op Consult  [address] [facility] [address]  Phone: [phone]          Patient: Monet Lewis   MR Number: [id number]   YOB: 1955   Date of Visit: 10/30/2017       Dear Dr. Rhonda G Leopold:    Thank you for referring Monet Lewis to me for evaluation. Attached you will find relevant portions of my assessment and plan of care.    If you have questions, please do not hesitate to call me. I look forward to following Monet Lewis along with you.    Sincerely,    Monserrat Sepulveda MD    Enclosure  CC:  Jacky Baker MD    If you would like to receive this communication electronically, please contact [email or web address] or [phone] to request more information on [employer] Link access.    For providers and/or their staff who would like to refer a patient to [facility], please contact us through our one-stop-shop provider referral line, [facility], at [phone].    If you feel you have received this communication in error or would no longer like to receive these types of communications, please e-mail [email or web address]

## 2017-10-30 NOTE — OUTPATIENT SUBJECTIVE & OBJECTIVE
Outpatient Subjective & Objective     Chief complaint-Preoperative evaluation, Perioperative Medical management, complication reduction plan     Active cardiac conditions- none    Revised cardiac risk index predictors- none    Functional capacity -Examples of physical activity , used to take 1 flight of stairs 6 months ago, was active until 6 months ago --- She can undertake all the above activities without  chest pain,chest tightness, Shortness of breath ,dizziness,lightheadedness making her exercise tolerance more, than 4 Mets.       Review of Systems   Constitutional: Negative for chills and fever.        No unusual weight changes   HENT:        STOPBANG score 3/8    Elevated BP  Age over 50   Neck size over 40 CM     Eyes:        No new visual changes   Respiratory:        No cough , phlegm     Cardiovascular:        As noted   Gastrointestinal:        No overt GI/ blood losses  Bowel movements- Regular    Endocrine:        Prednisone use > 20 mg daily for 3 weeks- none   Genitourinary: Positive for dysuria.        No hesitancy   Musculoskeletal:        As above      Skin: Negative for rash.   Neurological: Negative for syncope.        No unilateral weakness   Hematological:        Current use of Anticoagulants  none   Psychiatric/Behavioral:        No Depression,Anxiety      No vascular stenting   No past medical history pertinent negatives.  No family history on file.  Past Surgical History:   Procedure Laterality Date          HIP SURGERY         No , bleeding, cardiac problems , PONVwith previous surgeries/procedures.  Medications and Allergies reviewed in epic.   FH- No anesthesia, thrombosis/ bleeding  , early onset heart disease in family   Mother  at 33 years of colon cancer- pt gets cancer screening   Lives with , help available post op    Physical Exam   HENT:   Head: Normocephalic.       Physical Exam   HENT:   Head: Normocephalic.     Constitutional- Vitals - Body mass index is 29.8  "kg/m².,   Vitals:    10/30/17 0958   BP: 129/80   Pulse: 71   Temp: 98 °F (36.7 °C)     General appearance-Conscious,Coherent  Eyes- No conjunctival icterus,pupils  round  and reactive to light   ENT-Oral cavity- moist  , Hearing grossly normal   Neck- No thyromegaly ,Trachea -central, No jugular venous distension,   No Carotid Bruit   Cardiovascular -Heart Sounds- Normal  and  no murmur   , No gallop rhythm   Respiratory - Normal Respiratory Effort, Normal breath sounds and  no wheeze    Peripheral pitting pedal edema-- mild and  bilateral lower extremity telangiectasia , no calf pain   Gastrointestinal -Soft abdomen, No palpable masses, Non Tender,Liver,Spleen not palpable. No-- free fluid and shifting dullness  Musculoskeletal- No finger Clubbing. Strength grossly normal   Lymphatic-No Palpable cervical, axillary,Inguinal lymphadenopathy   Psychiatric - normal effect,Orientation  Rt Dorsalis pedis pulses-palpable    Lt Dorsalis pedis pulses- palpable   Rt Posterior tibial pulses -palpable   Left posterior tibial pulses -palpable   Miscellaneous -  no asterixis,  no dupuytren's contracture and  no renal bruit  Blood pressure 129/80, pulse 71, temperature 98 °F (36.7 °C), temperature source Oral, height 5' 4" (1.626 m), weight 78.7 kg (173 lb 9.6 oz), SpO2 97 %.      Investigations  Labs pending        Review of old records- Was done and information gathered regards to events leading to surgery and health conditions of significance in the perioperative period.    Outpatient Subjective & Objective   "

## 2017-10-30 NOTE — ASSESSMENT & PLAN NOTE
Hypertension-  Blood pressure is acceptable . I suggest continuation of Bystolic - during the entire perioperative period. .I suggest addressing pain control as uncontrolled pain can increased blood pressure

## 2017-10-30 NOTE — PROGRESS NOTES
[facility] - Pre Op Consult  Progress Note    Patient Name: Monet Lewis  MRN: [id number]  Date of Evaluation- 10/30/2017  PCP- Primary Doctor No    Future cases for Monet Lewis [[id number]]     Case ID Status Date Time Goran Procedure Provider Location    [id number] 11/7/2017  7:00  REVISION-ARTHROPLASTY-HIP-TOTAL Jacky Baker MD [[id number]] [facility] OR 2ND FLR      Left     HPI:  History of present illness- I had the pleasure of meeting this pleasant 62 y.o. lady in the pre op clinic prior to her elective Orthopedic surgery. The patient is new to me . Monet was accompanied by  Jacky.    I have obtained the history by speaking to the patient and by reviewing the electronic health records.    Events leading up to surgery / History of presenting illness -    She has been troubled with  mild- moderate- severe Left hip  pain for 2 years  . Pain increases with activity and decreases with resting.     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)  and at the age of 4 years , was operated   Had Hip replacement 6 months ago and has problem with length  Having pain in the Left knee also , grinds as she walks  Uses a shoe lift on the Rt compensate to compensate for length discrepancy    Hypertension ,On medication  Home  machine readings -  140/80    History of recurrent UTI's   Had urologist evaluation ( 3 providers ) multiple times   Had Cystoscopy   Gets UTI's once in 2 months  Was on preventive medication which did not help and lately on treatment when has UTI  On Treatment now for UTI     History of kidney stones -Left  Had parathyroid surgery about 4 years ago and since then had not passed any kidney stones      Coronary artery disease -  History of MI- 2 times  Last 5 years ago, first 10 years ago   Had heart cath with the first one-no blockages - unsure why she had MI  No History of CABG.No History of  stenting  Ablation done about 5 years ago  MI symptoms- Rt arm pain , moving across the chest   Second time , had neck pain   No other radiation   Associated with nausea. 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[place], English - no exertional chest pain, Tightness, SOB  Had Stress test about 6 months ago and has no problem to her understanding      mg po daily since the first attack  None for 3 Weeks   Suggested 81 mg coated ASA daily              Subjective/ Objective:          Chief complaint-Preoperative evaluation, Perioperative Medical management, complication reduction plan     Active cardiac conditions- none    Revised cardiac risk index predictors- none    Functional capacity -Examples of physical activity , used to take 1 flight of stairs 6 months ago, was active until 6 months ago --- She can undertake all the above activities without  chest pain,chest tightness, Shortness of breath ,dizziness,lightheadedness making her exercise tolerance more, than 4 Mets.       Review of Systems   Constitutional: Negative for chills and fever.        No unusual weight changes   HENT:        STOPBANG score 3/8    Elevated BP  Age over 50   Neck size over 40 CM     Eyes:        No new visual changes   Respiratory:        No cough , phlegm     Cardiovascular:        As noted   Gastrointestinal:        No overt GI/ blood losses  Bowel movements- Regular    Endocrine:        Prednisone use > 20 mg daily for 3 weeks- none   Genitourinary: Positive for dysuria.        No hesitancy   Musculoskeletal:        As above      Skin: Negative for rash.   Neurological: Negative for syncope.        No unilateral weakness   Hematological:        Current use of Anticoagulants  none   Psychiatric/Behavioral:        No Depression,Anxiety      No vascular stenting   No past medical history pertinent negatives.  No family history on file.  Past  "Surgical History:   Procedure Laterality Date          HIP SURGERY         No , bleeding, cardiac problems , PONVwith previous surgeries/procedures.  Medications and Allergies reviewed in epic.   FH- No anesthesia, thrombosis/ bleeding  , early onset heart disease in family   Mother  at 33 years of colon cancer- pt gets cancer screening   Lives with , help available post op    Physical Exam   HENT:   Head: Normocephalic.       Physical Exam   HENT:   Head: Normocephalic.     Constitutional- Vitals - Body mass index is 29.8 kg/m².,   Vitals:    10/30/17 0958   BP: 129/80   Pulse: 71   Temp: 98 °F (36.7 °C)     General appearance-Conscious,Coherent  Eyes- No conjunctival icterus,pupils  round  and reactive to light   ENT-Oral cavity- moist  , Hearing grossly normal   Neck- No thyromegaly ,Trachea -central, No jugular venous distension,   No Carotid Bruit   Cardiovascular -Heart Sounds- Normal  and  no murmur   , No gallop rhythm   Respiratory - Normal Respiratory Effort, Normal breath sounds and  no wheeze    Peripheral pitting pedal edema-- mild and  bilateral lower extremity telangiectasia , no calf pain   Gastrointestinal -Soft abdomen, No palpable masses, Non Tender,Liver,Spleen not palpable. No-- free fluid and shifting dullness  Musculoskeletal- No finger Clubbing. Strength grossly normal   Lymphatic-No Palpable cervical, axillary,Inguinal lymphadenopathy   Psychiatric - normal effect,Orientation  Rt Dorsalis pedis pulses-palpable    Lt Dorsalis pedis pulses- palpable   Rt Posterior tibial pulses -palpable   Left posterior tibial pulses -palpable   Miscellaneous -  no asterixis,  no dupuytren's contracture and  no renal bruit  Blood pressure 129/80, pulse 71, temperature 98 °F (36.7 °C), temperature source Oral, height 5' 4" (1.626 m), weight 78.7 kg (173 lb 9.6 oz), SpO2 97 %.      Investigations  Labs pending        Review of old records- Was done and information gathered regards to events leading " "to surgery and health conditions of significance in the perioperative period.        Preoperative cardiac risk assessment-  The patient does not have any active cardiac conditions . Revised cardiac risk index predictors- 0---.Functional capacity is more than 4 Mets. She will be undergoing a Orthopedic procedure that carries a intermediate risk     The estimated risk of the rate of adverse cardiac outcomes  0.4%    No further cardiac work up is indicated prior to proceeding with the surgery          American Society of Anesthesiologists Physical status classification ( ASA ) class: 3     Postoperative pulmonary complication risk assessment:    /80 Comment: rt  Pulse 71   Temp 98 °F (36.7 °C) (Oral)   Ht 5' 4" (1.626 m)   Wt 78.7 kg (173 lb 9.6 oz)   SpO2 97%   BMI 29.80 kg/m²       ARISCAT ( Canet) risk index- risk class -  Low, if duration of surgery is under 3 hours, intermediate, if duration of surgery is over 3 hours      Les Respiratory failure index- percentage risk of respiratory failure: q.8 % - partially dependent     Assessment/Plan:     Essential hypertension  Hypertension-  Blood pressure is acceptable . I suggest continuation of Bystolic - during the entire perioperative period. .I suggest addressing pain control as uncontrolled pain can increased blood pressure     Hx of prior ablation treatment  Cardiac   around 2012   Doing well since   Suggest cardiac monitoring alicia op     Coronary artery disease involving native coronary artery of native heart without angina pectoris  Cath no narrowings to her knowledge around 2007   Most recent stress test about 6 months , no problem to her understanding     Recurrent UTI  On Augmentin - 4 more days left     Hypoxia  With hip surgery about 6 months  Was on Opioid, Ambien   Seems to be opioid sensitive     Possible sleep apnea- snoring with Ambien- I suggest a sleep study and suggest caution with usage of medication that can cause respiratory " suppression in the perioperative period  potential ramifications of untreated sleep apnea, which could include daytime sleepiness, hypertension, heart disease and stroke were discussed            Edema  Edema- I suggested avoidance of added salt,avoidance of NSAID's ( except ASA 81 mg ) and suggested Limb elevation and vira hose use  Suggested discussing with PCP about ASA dose for the long term ( for some reasons she is on  mg daily )         Preventive perioperative care    Thromboembolic prophylaxis:  Her risk factors for thrombosis include surgical procedure and age.I suggest  thromboembolic prophylaxis ( mechanical/pharmacological, weighing the risk benefits of pharmacological agent use considering alicia procedural bleeding )  during the perioperative period.I suggested being active in the post operative period. The patient is a candidate for extended DVT prophylaxis     Postoperative pulmonary complication prophylaxis-Risk factors for post operative pulmonary complications include possible sleep apnea  ASA class >2- I suggest incentive spirometry use, early ambulation and end tidal carbon dioxide monitoring  , oral care , head end of bed elevation     Renal complication prophylaxis-Risk factors for renal complications include hypertension . I suggest keeping her well hydrated .I suggested drinking 2 litre's of water a day      Surgical site Infection Prophylaxis-I  suggest appropriate antibiotic for Prophylaxis against Surgical site infections     In view of regional anesthesia, Joint replacement  the patient  is at risk of postoperative urinary retention.  I suggest avoidance / minimizing the of  Benzodiazepines,Anticholinergic medication,antihistamines ( Benadryl) , if possible in the perioperative period. I suggest using the minimum possible use of opioids for the minimum period of time in the perioperative period. Benadryl avoidance suggested      This visit was focused on Preoperative evaluation,  Perioperative Medical management, complication reduction plans. I suggest that the patient follows up with primary care or relevant sub specialists for ongoing health care.    I appreciate the opportunity to be involved in this patients care. Please feel free to contact me if there were any questions about this consultation.    Patient is optimized    Monserrat Sepulveda MD  Perioperative Medicine  [facility]   Pager [phone]  ----    10/30- 18 10       CBC ,CMP ,INR -N  -------    11/6- 17 31     Clarification - Arozullah Respiratory failure index- percentage risk of respiratory failure:1.8   % - partially dependent , age contributors     Stress test April 2017     1. Pharmacologic stress nuclear study is normal.                                                                                                2. Normal SPECT perfusion imaging.                                                                                                              3. Normal systolic function. The calculated EF is at 74 %.                                                                                      4. There is no previous study available for comparison.     Called and spoke to her for follow up   Ready for surgery   No changes to Medication, health   Of Augmentin, no dysuria, no fever   On 81 Mg ASA daily - 1 week pre op   No vaginal thrush   Suggested follow up about ASA dose

## 2017-11-01 RX ORDER — MUPIROCIN 20 MG/G
OINTMENT TOPICAL
Status: CANCELLED | OUTPATIENT
Start: 2017-11-01

## 2017-11-01 NOTE — H&P
CC: Left hip pain    Monet Lewis is a 62 y.o. female who is s/p left total hip replacement in May of 2017 in [place]. She has leg length discrepancy and pain since surgery. Pain is worse with activity and weight bearing.  Patient has experienced interference of activities of daily living due to increased pain and decreased range of motion. Patient has failed non-operative treatment including physical therapy and medication.Monet Lewis ambulates using walker.    Past Medical History:   Diagnosis Date    Arthritis     Hypertension     Myocardial infarction        Past Surgical History:   Procedure Laterality Date    CARDIAC SURGERY      HIP SURGERY      HYSTERECTOMY         Family History   Problem Relation Age of Onset    Colon cancer Mother        Review of patient's allergies indicates:  No Known Allergies      Current Outpatient Prescriptions:     acetaminophen (TYLENOL) 500 MG tablet, Take 500 mg by mouth as needed. , Disp: , Rfl:     amoxicillin-clavulanate 500-125mg (AUGMENTIN) 500-125 mg Tab, Take 1 tablet by mouth every 12 (twelve) hours. Has 4 days lsft, Disp: , Rfl:     aspirin (ECOTRIN) 325 MG EC tablet, Take 325 mg by mouth every evening. , Disp: , Rfl:     BYSTOLIC 5 mg Tab, taking 2.5mg every hs, Disp: , Rfl: 1    calcium carbonate (OS-PURNIMA) 600 mg (1,500 mg) Tab, Take 600 mg by mouth every 30 days. , Disp: , Rfl:     calcium carbonate-vitamin D3 (CALCIUM 600 + D,3,) 600 mg calcium- 200 unit Cap, Take 600 mg by mouth 2 (two) times daily. , Disp: , Rfl:     fluconazole (DIFLUCAN) 150 MG Tab, Take 150 mg by mouth once. Will take 1 dose on Thurs 11/2, Disp: , Rfl:     zolpidem (AMBIEN) 10 mg Tab, Take 5 mg by mouth nightly as needed., Disp: , Rfl:     Review of Systems:   Constitutional: no fever or chills  Eyes: no visual changes  ENT: no nasal congestion or sore throat  Respiratory: no cough or shortness of breath  Cardiovascular: no chest pain or palpitations  Gastrointestinal:  "no nausea or vomiting, tolerating diet  Genitourinary: no hematuria or dysuria  Integument/Breast: no rash or pruritis  Hematologic/Lymphatic: no easy bruising or lymphadenopathy  Musculoskeletal: positive for hip pain  Neurological: no seizures or tremors  Behavioral/Psych: no auditory or visual hallucinations  Endocrine: no heat or cold intolerance    PE:  Ht 5' 4" (1.626 m)   Wt 78.5 kg (173 lb)   BMI 29.70 kg/m²   General: Pleasant, cooperative, NAD   Gait: antalgic  HEENT: NCAT, sclera nonicteric   Lungs: Respirations are clear, equal and unlabored.   CV: S1S2; 2+ bilateral upper and lower extremity pulses.   Skin: Intact throughout LE with no rashes, erythema, or lesions  Extremities: No LE edema, NVI lower extremities    Left Hip Exam:  90 degrees flexion  10 degrees extension   20 degrees internal rotation  20 degrees external rotation  25 degrees abduction  20 degrees adduction  There is pain with passive range of motion.     Radiographs: Radiographs reveal severe pelvic obliquity; Total hip prosthesis on left    Diagnosis: failed left total hip, acquired leg length discrepance    Plan: Revise left total hip. Plan to remove femoral component and shorten neck cut. Possible conversion to MDM.    Due to the serious nature of total joint infection and the high prevalence of community acquired MRSA, vancomycin will be used perioperatively.              "

## 2017-11-01 NOTE — PROGRESS NOTES
Monet Lewis is a 62 y.o. year old here today for a pre-operative visit in preparation for a revision Left total hip arthroplasty to be performed by  Dr. Baker on 11/7.  she was last seen and treated in the clinic on 9/20/2017. she will be medically optimized by the pre op center. There has been no significant change in medical status since last visit. No fever, chills, malaise, or unexplained weight change.      Allergies, Medications, past medical and surgical history reviewed.    Focused examination performed.    Patient declined to see Dr. Baker today in clinic.  All questions answered. Patient encouraged to call with questions. Contact information given.     Pre, alicia, and post operative procedures and expectations discussed. Questions were answered. Monet Lewis has been educated and is ready to proceed with surgery. Approximately 30 minutes was spent discussing surgical outcomes, plans, procedures pre, alicia, and post operative expections and care.  Surgical consent signed.    Monet Lewis will contact us if there are any questions, concerns, or changes in medical status prior to surgery.

## 2017-11-05 ENCOUNTER — PATIENT MESSAGE (OUTPATIENT)
Dept: ORTHOPEDICS | Facility: CLINIC | Age: 62
End: 2017-11-05

## 2017-11-06 ENCOUNTER — TELEPHONE (OUTPATIENT)
Dept: ORTHOPEDICS | Facility: CLINIC | Age: 62
End: 2017-11-06

## 2017-11-06 NOTE — TELEPHONE ENCOUNTER
Spoke to pt and informed her that the arrival time for her procedure is 0800 at the second floor sx center and no food or drinks after midnight tonight. Pt verbalized understanding.

## 2017-11-07 ENCOUNTER — HOSPITAL ENCOUNTER (INPATIENT)
Facility: HOSPITAL | Age: 62
LOS: 1 days | Discharge: HOME HEALTH | DRG: 468 | End: 2017-11-08
Attending: ORTHOPAEDIC SURGERY | Admitting: ORTHOPAEDIC SURGERY
Payer: COMMERCIAL

## 2017-11-07 ENCOUNTER — ANESTHESIA (OUTPATIENT)
Dept: SURGERY | Facility: HOSPITAL | Age: 62
DRG: 468 | End: 2017-11-07
Payer: COMMERCIAL

## 2017-11-07 ENCOUNTER — SURGERY (OUTPATIENT)
Age: 62
End: 2017-11-07

## 2017-11-07 DIAGNOSIS — M21.70 ACQUIRED UNEQUAL LIMB LENGTH: ICD-10-CM

## 2017-11-07 DIAGNOSIS — M25.552 LEFT HIP PAIN: ICD-10-CM

## 2017-11-07 DIAGNOSIS — Z96.642 HISTORY OF LEFT HIP REPLACEMENT: ICD-10-CM

## 2017-11-07 LAB
GRAM STN SPEC: NORMAL
HCT VFR BLD AUTO: 37.2 %
HGB BLD-MCNC: 12.2 G/DL

## 2017-11-07 PROCEDURE — 36415 COLL VENOUS BLD VENIPUNCTURE: CPT

## 2017-11-07 PROCEDURE — 87205 SMEAR GRAM STAIN: CPT | Mod: 59

## 2017-11-07 PROCEDURE — C1713 ANCHOR/SCREW BN/BN,TIS/BN: HCPCS | Performed by: ORTHOPAEDIC SURGERY

## 2017-11-07 PROCEDURE — D9220A PRA ANESTHESIA: Mod: ANES,,, | Performed by: ANESTHESIOLOGY

## 2017-11-07 PROCEDURE — 36000710: Performed by: ORTHOPAEDIC SURGERY

## 2017-11-07 PROCEDURE — 71000033 HC RECOVERY, INTIAL HOUR: Performed by: ORTHOPAEDIC SURGERY

## 2017-11-07 PROCEDURE — 63600175 PHARM REV CODE 636 W HCPCS: Performed by: NURSE ANESTHETIST, CERTIFIED REGISTERED

## 2017-11-07 PROCEDURE — 25000003 PHARM REV CODE 250: Performed by: ANESTHESIOLOGY

## 2017-11-07 PROCEDURE — 63600175 PHARM REV CODE 636 W HCPCS: Performed by: STUDENT IN AN ORGANIZED HEALTH CARE EDUCATION/TRAINING PROGRAM

## 2017-11-07 PROCEDURE — 86920 COMPATIBILITY TEST SPIN: CPT

## 2017-11-07 PROCEDURE — C1776 JOINT DEVICE (IMPLANTABLE): HCPCS | Performed by: ORTHOPAEDIC SURGERY

## 2017-11-07 PROCEDURE — 63600175 PHARM REV CODE 636 W HCPCS: Performed by: PHYSICIAN ASSISTANT

## 2017-11-07 PROCEDURE — 27000221 HC OXYGEN, UP TO 24 HOURS

## 2017-11-07 PROCEDURE — 27200710 HC EPIDURAL INFUSION PUMP SET: Performed by: NURSE ANESTHETIST, CERTIFIED REGISTERED

## 2017-11-07 PROCEDURE — 25000003 PHARM REV CODE 250: Performed by: PHYSICIAN ASSISTANT

## 2017-11-07 PROCEDURE — 37000008 HC ANESTHESIA 1ST 15 MINUTES: Performed by: ORTHOPAEDIC SURGERY

## 2017-11-07 PROCEDURE — 88300 SURGICAL PATH GROSS: CPT | Mod: 26,,, | Performed by: PATHOLOGY

## 2017-11-07 PROCEDURE — 71000039 HC RECOVERY, EACH ADD'L HOUR: Performed by: ORTHOPAEDIC SURGERY

## 2017-11-07 PROCEDURE — 27201423 OPTIME MED/SURG SUP & DEVICES STERILE SUPPLY: Performed by: ORTHOPAEDIC SURGERY

## 2017-11-07 PROCEDURE — 63600175 PHARM REV CODE 636 W HCPCS: Performed by: ORTHOPAEDIC SURGERY

## 2017-11-07 PROCEDURE — 27100025 HC TUBING, SET FLUID WARMER: Performed by: NURSE ANESTHETIST, CERTIFIED REGISTERED

## 2017-11-07 PROCEDURE — 25000003 PHARM REV CODE 250: Performed by: STUDENT IN AN ORGANIZED HEALTH CARE EDUCATION/TRAINING PROGRAM

## 2017-11-07 PROCEDURE — 87102 FUNGUS ISOLATION CULTURE: CPT | Mod: 59

## 2017-11-07 PROCEDURE — 27134 REVISE HIP JOINT REPLACEMENT: CPT | Mod: LT,,, | Performed by: ORTHOPAEDIC SURGERY

## 2017-11-07 PROCEDURE — 37000009 HC ANESTHESIA EA ADD 15 MINS: Performed by: ORTHOPAEDIC SURGERY

## 2017-11-07 PROCEDURE — 87070 CULTURE OTHR SPECIMN AEROBIC: CPT

## 2017-11-07 PROCEDURE — D9220A PRA ANESTHESIA: Mod: CRNA,,, | Performed by: NURSE ANESTHETIST, CERTIFIED REGISTERED

## 2017-11-07 PROCEDURE — 25000003 PHARM REV CODE 250: Performed by: ORTHOPAEDIC SURGERY

## 2017-11-07 PROCEDURE — 87075 CULTR BACTERIA EXCEPT BLOOD: CPT | Mod: 59

## 2017-11-07 PROCEDURE — 25000003 PHARM REV CODE 250: Performed by: NURSE ANESTHETIST, CERTIFIED REGISTERED

## 2017-11-07 PROCEDURE — 87176 TISSUE HOMOGENIZATION CULTR: CPT | Mod: 91

## 2017-11-07 PROCEDURE — 11000001 HC ACUTE MED/SURG PRIVATE ROOM

## 2017-11-07 PROCEDURE — 0SPB0JZ REMOVAL OF SYNTHETIC SUBSTITUTE FROM LEFT HIP JOINT, OPEN APPROACH: ICD-10-PCS | Performed by: ORTHOPAEDIC SURGERY

## 2017-11-07 PROCEDURE — 36000711: Performed by: ORTHOPAEDIC SURGERY

## 2017-11-07 PROCEDURE — 27201037 HC PRESSURE MONITORING SET UP

## 2017-11-07 PROCEDURE — 85014 HEMATOCRIT: CPT

## 2017-11-07 PROCEDURE — 27200688 HC TRAY, SPINAL-HYPER/ ISOBARIC: Performed by: NURSE ANESTHETIST, CERTIFIED REGISTERED

## 2017-11-07 PROCEDURE — 87116 MYCOBACTERIA CULTURE: CPT | Mod: 59

## 2017-11-07 PROCEDURE — 88300 SURGICAL PATH GROSS: CPT | Performed by: PATHOLOGY

## 2017-11-07 PROCEDURE — 0SRB0JZ REPLACEMENT OF LEFT HIP JOINT WITH SYNTHETIC SUBSTITUTE, OPEN APPROACH: ICD-10-PCS | Performed by: ORTHOPAEDIC SURGERY

## 2017-11-07 PROCEDURE — 27800517 HC TRAY,EPIDURAL-CONTINUOUS: Performed by: NURSE ANESTHETIST, CERTIFIED REGISTERED

## 2017-11-07 PROCEDURE — 85018 HEMOGLOBIN: CPT

## 2017-11-07 PROCEDURE — 3E0T3BZ INTRODUCTION OF ANESTHETIC AGENT INTO PERIPHERAL NERVES AND PLEXI, PERCUTANEOUS APPROACH: ICD-10-PCS | Performed by: ANESTHESIOLOGY

## 2017-11-07 DEVICE — ACCORD 2 MM COCR CABLE W/CLAMP: Type: IMPLANTABLE DEVICE | Site: HIP | Status: FUNCTIONAL

## 2017-11-07 DEVICE — IMPLANTABLE DEVICE: Type: IMPLANTABLE DEVICE | Site: HIP | Status: FUNCTIONAL

## 2017-11-07 DEVICE — INSERT ADM X3 28MM CUP OD 48MM: Type: IMPLANTABLE DEVICE | Site: HIP | Status: FUNCTIONAL

## 2017-11-07 DEVICE — LINER ACET SZ E 42MM COCR: Type: IMPLANTABLE DEVICE | Site: HIP | Status: FUNCTIONAL

## 2017-11-07 RX ORDER — CELECOXIB 200 MG/1
400 CAPSULE ORAL ONCE
Status: COMPLETED | OUTPATIENT
Start: 2017-11-07 | End: 2017-11-07

## 2017-11-07 RX ORDER — FLUCONAZOLE 150 MG/1
150 TABLET ORAL ONCE
Status: DISCONTINUED | OUTPATIENT
Start: 2017-11-07 | End: 2017-11-07

## 2017-11-07 RX ORDER — MORPHINE SULFATE 2 MG/ML
2 INJECTION, SOLUTION INTRAMUSCULAR; INTRAVENOUS
Status: DISCONTINUED | OUTPATIENT
Start: 2017-11-08 | End: 2017-11-08 | Stop reason: HOSPADM

## 2017-11-07 RX ORDER — ACETAMINOPHEN 10 MG/ML
1000 INJECTION, SOLUTION INTRAVENOUS ONCE
Status: COMPLETED | OUTPATIENT
Start: 2017-11-07 | End: 2017-11-07

## 2017-11-07 RX ORDER — BUPIVACAINE HYDROCHLORIDE 5 MG/ML
INJECTION, SOLUTION EPIDURAL; INTRACAUDAL
Status: DISPENSED
Start: 2017-11-07 | End: 2017-11-07

## 2017-11-07 RX ORDER — OXYCODONE HYDROCHLORIDE 5 MG/1
5 TABLET ORAL
Status: DISCONTINUED | OUTPATIENT
Start: 2017-11-08 | End: 2017-11-08 | Stop reason: HOSPADM

## 2017-11-07 RX ORDER — ONDANSETRON 2 MG/ML
4 INJECTION INTRAMUSCULAR; INTRAVENOUS EVERY 8 HOURS PRN
Status: DISCONTINUED | OUTPATIENT
Start: 2017-11-07 | End: 2017-11-08 | Stop reason: HOSPADM

## 2017-11-07 RX ORDER — ONDANSETRON 2 MG/ML
4 INJECTION INTRAMUSCULAR; INTRAVENOUS EVERY 12 HOURS PRN
Status: DISCONTINUED | OUTPATIENT
Start: 2017-11-07 | End: 2017-11-07

## 2017-11-07 RX ORDER — NALOXONE HCL 0.4 MG/ML
0.02 VIAL (ML) INJECTION
Status: DISCONTINUED | OUTPATIENT
Start: 2017-11-07 | End: 2017-11-08 | Stop reason: HOSPADM

## 2017-11-07 RX ORDER — ACETAMINOPHEN 500 MG
1000 TABLET ORAL EVERY 6 HOURS
Status: DISCONTINUED | OUTPATIENT
Start: 2017-11-07 | End: 2017-11-08 | Stop reason: HOSPADM

## 2017-11-07 RX ORDER — MIDAZOLAM HYDROCHLORIDE 1 MG/ML
INJECTION, SOLUTION INTRAMUSCULAR; INTRAVENOUS
Status: DISCONTINUED | OUTPATIENT
Start: 2017-11-07 | End: 2017-11-07

## 2017-11-07 RX ORDER — MUPIROCIN 20 MG/G
1 OINTMENT TOPICAL 2 TIMES DAILY
Status: DISCONTINUED | OUTPATIENT
Start: 2017-11-07 | End: 2017-11-08 | Stop reason: HOSPADM

## 2017-11-07 RX ORDER — RAMELTEON 8 MG/1
8 TABLET ORAL NIGHTLY PRN
Status: DISCONTINUED | OUTPATIENT
Start: 2017-11-07 | End: 2017-11-08 | Stop reason: HOSPADM

## 2017-11-07 RX ORDER — SODIUM CHLORIDE 9 MG/ML
INJECTION, SOLUTION INTRAVENOUS CONTINUOUS PRN
Status: DISCONTINUED | OUTPATIENT
Start: 2017-11-07 | End: 2017-11-07

## 2017-11-07 RX ORDER — LIDOCAINE HCL/PF 100 MG/5ML
SYRINGE (ML) INTRAVENOUS
Status: DISCONTINUED | OUTPATIENT
Start: 2017-11-07 | End: 2017-11-07

## 2017-11-07 RX ORDER — OXYCODONE HYDROCHLORIDE 5 MG/1
10 TABLET ORAL
Status: DISCONTINUED | OUTPATIENT
Start: 2017-11-08 | End: 2017-11-08 | Stop reason: HOSPADM

## 2017-11-07 RX ORDER — NEBIVOLOL 5 MG/1
5 TABLET ORAL DAILY
Status: DISCONTINUED | OUTPATIENT
Start: 2017-11-08 | End: 2017-11-08 | Stop reason: HOSPADM

## 2017-11-07 RX ORDER — LIDOCAINE HYDROCHLORIDE AND EPINEPHRINE 15; 5 MG/ML; UG/ML
INJECTION, SOLUTION EPIDURAL
Status: DISCONTINUED | OUTPATIENT
Start: 2017-11-07 | End: 2017-11-07

## 2017-11-07 RX ORDER — CEFAZOLIN SODIUM 2 G/50ML
2 SOLUTION INTRAVENOUS
Status: COMPLETED | OUTPATIENT
Start: 2017-11-07 | End: 2017-11-08

## 2017-11-07 RX ORDER — MUPIROCIN 20 MG/G
OINTMENT TOPICAL
Status: DISCONTINUED | OUTPATIENT
Start: 2017-11-07 | End: 2017-11-07

## 2017-11-07 RX ORDER — SODIUM CHLORIDE 9 MG/ML
INJECTION, SOLUTION INTRAVENOUS CONTINUOUS
Status: ACTIVE | OUTPATIENT
Start: 2017-11-07 | End: 2017-11-08

## 2017-11-07 RX ORDER — ONDANSETRON 2 MG/ML
INJECTION INTRAMUSCULAR; INTRAVENOUS
Status: DISCONTINUED | OUTPATIENT
Start: 2017-11-07 | End: 2017-11-07

## 2017-11-07 RX ORDER — CELECOXIB 100 MG/1
200 CAPSULE ORAL DAILY
Status: DISCONTINUED | OUTPATIENT
Start: 2017-11-08 | End: 2017-11-08 | Stop reason: HOSPADM

## 2017-11-07 RX ORDER — DIPHENHYDRAMINE HYDROCHLORIDE 50 MG/ML
12.5 INJECTION INTRAMUSCULAR; INTRAVENOUS EVERY 4 HOURS PRN
Status: DISCONTINUED | OUTPATIENT
Start: 2017-11-07 | End: 2017-11-08 | Stop reason: HOSPADM

## 2017-11-07 RX ORDER — OXYCODONE AND ACETAMINOPHEN 10; 325 MG/1; MG/1
1 TABLET ORAL EVERY 4 HOURS PRN
Qty: 90 TABLET | Refills: 0 | Status: SHIPPED | OUTPATIENT
Start: 2017-11-07 | End: 2017-12-18

## 2017-11-07 RX ORDER — KETAMINE HYDROCHLORIDE 100 MG/ML
INJECTION, SOLUTION INTRAMUSCULAR; INTRAVENOUS
Status: DISCONTINUED | OUTPATIENT
Start: 2017-11-07 | End: 2017-11-07

## 2017-11-07 RX ORDER — FENTANYL CITRATE 50 UG/ML
25 INJECTION, SOLUTION INTRAMUSCULAR; INTRAVENOUS EVERY 5 MIN PRN
Status: DISCONTINUED | OUTPATIENT
Start: 2017-11-07 | End: 2017-11-07 | Stop reason: HOSPADM

## 2017-11-07 RX ORDER — PROPOFOL 10 MG/ML
VIAL (ML) INTRAVENOUS CONTINUOUS PRN
Status: DISCONTINUED | OUTPATIENT
Start: 2017-11-07 | End: 2017-11-07

## 2017-11-07 RX ORDER — SODIUM CHLORIDE 0.9 % (FLUSH) 0.9 %
3 SYRINGE (ML) INJECTION
Status: DISCONTINUED | OUTPATIENT
Start: 2017-11-07 | End: 2017-11-08 | Stop reason: HOSPADM

## 2017-11-07 RX ORDER — ZOLPIDEM TARTRATE 5 MG/1
5 TABLET ORAL NIGHTLY PRN
Status: DISCONTINUED | OUTPATIENT
Start: 2017-11-07 | End: 2017-11-07

## 2017-11-07 RX ORDER — SODIUM CHLORIDE 0.9 % (FLUSH) 0.9 %
3 SYRINGE (ML) INJECTION EVERY 8 HOURS
Status: DISCONTINUED | OUTPATIENT
Start: 2017-11-08 | End: 2017-11-08 | Stop reason: HOSPADM

## 2017-11-07 RX ORDER — ASPIRIN 325 MG
325 TABLET ORAL 2 TIMES DAILY
Status: DISCONTINUED | OUTPATIENT
Start: 2017-11-07 | End: 2017-11-08 | Stop reason: HOSPADM

## 2017-11-07 RX ORDER — PROPOFOL 10 MG/ML
VIAL (ML) INTRAVENOUS
Status: DISCONTINUED | OUTPATIENT
Start: 2017-11-07 | End: 2017-11-07

## 2017-11-07 RX ORDER — POLYETHYLENE GLYCOL 3350 17 G/17G
17 POWDER, FOR SOLUTION ORAL DAILY
Status: DISCONTINUED | OUTPATIENT
Start: 2017-11-07 | End: 2017-11-08 | Stop reason: HOSPADM

## 2017-11-07 RX ORDER — PREGABALIN 150 MG/1
150 CAPSULE ORAL NIGHTLY
Status: DISCONTINUED | OUTPATIENT
Start: 2017-11-07 | End: 2017-11-08 | Stop reason: HOSPADM

## 2017-11-07 RX ORDER — PHENYLEPHRINE HYDROCHLORIDE 10 MG/ML
INJECTION INTRAVENOUS
Status: DISCONTINUED | OUTPATIENT
Start: 2017-11-07 | End: 2017-11-07

## 2017-11-07 RX ORDER — FENTANYL CITRATE 50 UG/ML
INJECTION, SOLUTION INTRAMUSCULAR; INTRAVENOUS
Status: DISCONTINUED | OUTPATIENT
Start: 2017-11-07 | End: 2017-11-07

## 2017-11-07 RX ORDER — OXYCODONE HYDROCHLORIDE 5 MG/1
15 TABLET ORAL
Status: DISCONTINUED | OUTPATIENT
Start: 2017-11-08 | End: 2017-11-08 | Stop reason: HOSPADM

## 2017-11-07 RX ORDER — DOCUSATE SODIUM 100 MG/1
100 CAPSULE, LIQUID FILLED ORAL 2 TIMES DAILY PRN
Qty: 60 CAPSULE | Refills: 1 | Status: SHIPPED | OUTPATIENT
Start: 2017-11-07 | End: 2017-12-18

## 2017-11-07 RX ORDER — ONDANSETRON HYDROCHLORIDE 8 MG/1
8 TABLET, FILM COATED ORAL EVERY 12 HOURS PRN
Qty: 60 TABLET | Refills: 0 | Status: SHIPPED | OUTPATIENT
Start: 2017-11-07 | End: 2017-12-18

## 2017-11-07 RX ORDER — TRANEXAMIC ACID 100 MG/ML
10 INJECTION, SOLUTION INTRAVENOUS ONCE
Status: DISCONTINUED | OUTPATIENT
Start: 2017-11-07 | End: 2017-11-07

## 2017-11-07 RX ORDER — ASPIRIN 325 MG
325 TABLET ORAL 2 TIMES DAILY
Qty: 70 TABLET | Refills: 0 | Status: SHIPPED | OUTPATIENT
Start: 2017-11-07 | End: 2017-12-18

## 2017-11-07 RX ORDER — FAMOTIDINE 20 MG/1
20 TABLET, FILM COATED ORAL 2 TIMES DAILY
Status: DISCONTINUED | OUTPATIENT
Start: 2017-11-07 | End: 2017-11-08 | Stop reason: HOSPADM

## 2017-11-07 RX ORDER — HYDROCODONE BITARTRATE AND ACETAMINOPHEN 500; 5 MG/1; MG/1
TABLET ORAL
Status: DISCONTINUED | OUTPATIENT
Start: 2017-11-07 | End: 2017-11-08 | Stop reason: HOSPADM

## 2017-11-07 RX ORDER — DEXAMETHASONE SODIUM PHOSPHATE 4 MG/ML
INJECTION, SOLUTION INTRA-ARTICULAR; INTRALESIONAL; INTRAMUSCULAR; INTRAVENOUS; SOFT TISSUE
Status: DISCONTINUED | OUTPATIENT
Start: 2017-11-07 | End: 2017-11-07

## 2017-11-07 RX ORDER — AMOXICILLIN 250 MG
1 CAPSULE ORAL 2 TIMES DAILY
Status: DISCONTINUED | OUTPATIENT
Start: 2017-11-07 | End: 2017-11-08 | Stop reason: HOSPADM

## 2017-11-07 RX ORDER — BISACODYL 10 MG
10 SUPPOSITORY, RECTAL RECTAL EVERY 12 HOURS PRN
Status: DISCONTINUED | OUTPATIENT
Start: 2017-11-07 | End: 2017-11-08 | Stop reason: HOSPADM

## 2017-11-07 RX ADMIN — VANCOMYCIN HYDROCHLORIDE 1500 MG: 5 INJECTION, POWDER, LYOPHILIZED, FOR SOLUTION INTRAVENOUS at 08:11

## 2017-11-07 RX ADMIN — SODIUM CHLORIDE, SODIUM GLUCONATE, SODIUM ACETATE, POTASSIUM CHLORIDE, MAGNESIUM CHLORIDE, SODIUM PHOSPHATE, DIBASIC, AND POTASSIUM PHOSPHATE: .53; .5; .37; .037; .03; .012; .00082 INJECTION, SOLUTION INTRAVENOUS at 01:11

## 2017-11-07 RX ADMIN — MIDAZOLAM HYDROCHLORIDE 1.5 MG: 1 INJECTION, SOLUTION INTRAMUSCULAR; INTRAVENOUS at 01:11

## 2017-11-07 RX ADMIN — ACETAMINOPHEN 1000 MG: 10 INJECTION, SOLUTION INTRAVENOUS at 03:11

## 2017-11-07 RX ADMIN — PROPOFOL 10 MG: 10 INJECTION, EMULSION INTRAVENOUS at 01:11

## 2017-11-07 RX ADMIN — PHENYLEPHRINE HYDROCHLORIDE 100 MCG: 10 INJECTION INTRAVENOUS at 12:11

## 2017-11-07 RX ADMIN — LIDOCAINE HYDROCHLORIDE 20 MG: 20 INJECTION, SOLUTION INTRAVENOUS at 11:11

## 2017-11-07 RX ADMIN — MIDAZOLAM HYDROCHLORIDE 0.5 MG: 1 INJECTION, SOLUTION INTRAMUSCULAR; INTRAVENOUS at 11:11

## 2017-11-07 RX ADMIN — STANDARDIZED SENNA CONCENTRATE AND DOCUSATE SODIUM 1 TABLET: 8.6; 5 TABLET, FILM COATED ORAL at 09:11

## 2017-11-07 RX ADMIN — ASPIRIN 325 MG ORAL TABLET 325 MG: 325 PILL ORAL at 09:11

## 2017-11-07 RX ADMIN — TRANEXAMIC ACID 3000 MG: 100 INJECTION, SOLUTION INTRAVENOUS at 12:11

## 2017-11-07 RX ADMIN — Medication 2 G: at 11:11

## 2017-11-07 RX ADMIN — CELECOXIB 400 MG: 200 CAPSULE ORAL at 03:11

## 2017-11-07 RX ADMIN — FENTANYL CITRATE 50 MCG: 50 INJECTION, SOLUTION INTRAMUSCULAR; INTRAVENOUS at 11:11

## 2017-11-07 RX ADMIN — PREGABALIN 150 MG: 150 CAPSULE ORAL at 09:11

## 2017-11-07 RX ADMIN — DEXAMETHASONE SODIUM PHOSPHATE 8 MG: 4 INJECTION, SOLUTION INTRAMUSCULAR; INTRAVENOUS at 11:11

## 2017-11-07 RX ADMIN — Medication 6 ML/HR: at 03:11

## 2017-11-07 RX ADMIN — MEPIVACAINE HYDROCHLORIDE 4 ML/HR: 20 INJECTION, SOLUTION EPIDURAL; INFILTRATION at 01:11

## 2017-11-07 RX ADMIN — EPHEDRINE SULFATE 10 MG: 50 INJECTION, SOLUTION INTRAMUSCULAR; INTRAVENOUS; SUBCUTANEOUS at 12:11

## 2017-11-07 RX ADMIN — POLYETHYLENE GLYCOL 3350 17 G: 17 POWDER, FOR SOLUTION ORAL at 03:11

## 2017-11-07 RX ADMIN — TRANEXAMIC ACID 771 MG: 100 INJECTION, SOLUTION INTRAVENOUS at 11:11

## 2017-11-07 RX ADMIN — PHENYLEPHRINE HYDROCHLORIDE 200 MCG: 10 INJECTION INTRAVENOUS at 02:11

## 2017-11-07 RX ADMIN — EPHEDRINE SULFATE 10 MG: 50 INJECTION, SOLUTION INTRAMUSCULAR; INTRAVENOUS; SUBCUTANEOUS at 02:11

## 2017-11-07 RX ADMIN — LIDOCAINE HYDROCHLORIDE,EPINEPHRINE BITARTRATE 3 MG: 15; .005 INJECTION, SOLUTION EPIDURAL; INFILTRATION; INTRACAUDAL; PERINEURAL at 01:11

## 2017-11-07 RX ADMIN — SODIUM CHLORIDE: 0.9 INJECTION, SOLUTION INTRAVENOUS at 08:11

## 2017-11-07 RX ADMIN — DIPHENHYDRAMINE HYDROCHLORIDE 12.5 MG: 50 INJECTION, SOLUTION INTRAMUSCULAR; INTRAVENOUS at 11:11

## 2017-11-07 RX ADMIN — KETAMINE HYDROCHLORIDE 20 MG: 100 INJECTION, SOLUTION, CONCENTRATE INTRAMUSCULAR; INTRAVENOUS at 12:11

## 2017-11-07 RX ADMIN — ACETAMINOPHEN 1000 MG: 500 TABLET ORAL at 09:11

## 2017-11-07 RX ADMIN — PROPOFOL 100 MCG/KG/MIN: 10 INJECTION, EMULSION INTRAVENOUS at 11:11

## 2017-11-07 RX ADMIN — KETAMINE HYDROCHLORIDE 20 MG: 100 INJECTION, SOLUTION, CONCENTRATE INTRAMUSCULAR; INTRAVENOUS at 01:11

## 2017-11-07 RX ADMIN — ONDANSETRON 4 MG: 2 INJECTION INTRAMUSCULAR; INTRAVENOUS at 01:11

## 2017-11-07 RX ADMIN — FAMOTIDINE 20 MG: 20 TABLET, FILM COATED ORAL at 09:11

## 2017-11-07 RX ADMIN — SODIUM CHLORIDE, SODIUM GLUCONATE, SODIUM ACETATE, POTASSIUM CHLORIDE, MAGNESIUM CHLORIDE, SODIUM PHOSPHATE, DIBASIC, AND POTASSIUM PHOSPHATE: .53; .5; .37; .037; .03; .012; .00082 INJECTION, SOLUTION INTRAVENOUS at 11:11

## 2017-11-07 RX ADMIN — SODIUM CHLORIDE, SODIUM GLUCONATE, SODIUM ACETATE, POTASSIUM CHLORIDE, MAGNESIUM CHLORIDE, SODIUM PHOSPHATE, DIBASIC, AND POTASSIUM PHOSPHATE: .53; .5; .37; .037; .03; .012; .00082 INJECTION, SOLUTION INTRAVENOUS at 12:11

## 2017-11-07 RX ADMIN — MIDAZOLAM HYDROCHLORIDE 2 MG: 1 INJECTION, SOLUTION INTRAMUSCULAR; INTRAVENOUS at 11:11

## 2017-11-07 RX ADMIN — CEFAZOLIN SODIUM 2 G: 2 SOLUTION INTRAVENOUS at 09:11

## 2017-11-07 RX ADMIN — VANCOMYCIN HYDROCHLORIDE 1000 MG: 1 INJECTION, POWDER, LYOPHILIZED, FOR SOLUTION INTRAVENOUS at 11:11

## 2017-11-07 RX ADMIN — EPHEDRINE SULFATE 10 MG: 50 INJECTION, SOLUTION INTRAMUSCULAR; INTRAVENOUS; SUBCUTANEOUS at 01:11

## 2017-11-07 RX ADMIN — MUPIROCIN: 20 OINTMENT TOPICAL at 08:11

## 2017-11-07 RX ADMIN — SODIUM CHLORIDE: 0.9 INJECTION, SOLUTION INTRAVENOUS at 03:11

## 2017-11-07 NOTE — ANESTHESIA POSTPROCEDURE EVALUATION
"Anesthesia Post Evaluation    Patient: Monet Lewis    Procedure(s) Performed: Procedure(s) (LRB):  REVISION-ARTHROPLASTY-HIP-TOTAL (Left)    Final Anesthesia Type: CSE  Patient location during evaluation: PACU  Patient participation: Yes- Able to Participate  Level of consciousness: awake and alert  Post-procedure vital signs: reviewed and stable  Pain management: adequate  Airway patency: patent  PONV status at discharge: No PONV  Anesthetic complications: no      Cardiovascular status: hemodynamically stable  Respiratory status: room air, spontaneous ventilation and unassisted  Hydration status: euvolemic  Follow-up not needed.        Visit Vitals  /72 (BP Location: Right arm, Patient Position: Lying)   Pulse 68   Temp 36.6 °C (97.8 °F) (Axillary)   Resp 18   Ht 5' 4" (1.626 m)   Wt 77.1 kg (170 lb)   SpO2 98%   Breastfeeding? No   BMI 29.18 kg/m²       Pain/Arsh Score: Pain Assessment Performed: Yes (11/7/2017  2:56 PM)  Presence of Pain: denies (11/7/2017  2:56 PM)  Pain Rating Prior to Med Admin: 0 (11/7/2017  3:40 PM)  Arsh Score: 8 (11/7/2017  2:56 PM)      "

## 2017-11-07 NOTE — BRIEF OP NOTE
DATE OF PROCEDURE: 11/7/17  PREOPERATIVE DIAGNOSIS: Limb Length Inequality S/P Left Total Hip  POSTOPERATIVE DIAGNOSIS:  Limb Length Inequality S/P Left Total Hip  PROCEDURES PERFORMED: Revision Left Total Hip  ANESTHESIA: Spinal/Epidural  SURGEON: Jacky Baker M.D.   ASSISTANT: von  SPECIMENS: Explant/Soft Tissue  FINDINGS: Long Limb  URINE: 1550 mL .   FLUID: 4600 mL    BLOOD LOSS: 550 mL    COMPLICATION: None  To PACU Stable

## 2017-11-07 NOTE — TRANSFER OF CARE
"Anesthesia Transfer of Care Note    Patient: Monet Lewis    Procedure(s) Performed: Procedure(s) (LRB):  REVISION-ARTHROPLASTY-HIP-TOTAL (Left)    Patient location: PACU    Anesthesia Type: regional    Transport from OR: Transported from OR on 2-3 L/min O2 by NC with adequate spontaneous ventilation    Post pain: adequate analgesia    Post assessment: no apparent anesthetic complications and tolerated procedure well    Post vital signs: stable    Level of consciousness: awake    Nausea/Vomiting: no nausea/vomiting    Complications: none    Transfer of care protocol was followed      Last vitals:   Visit Vitals  /89   Pulse 68   Temp 36.8 °C (98.2 °F)   Resp 18   Ht 5' 4" (1.626 m)   Wt 77.1 kg (170 lb)   SpO2 99%   Breastfeeding? No   BMI 29.18 kg/m²     "

## 2017-11-07 NOTE — PLAN OF CARE
[facility]    HOME HEALTH ORDERS  FACE TO FACE ENCOUNTER    Patient Name: Monet Lewis  YOB: 1955    PCP: Primary Doctor No   PCP Address: None  PCP Phone Number: None  PCP Fax: None    Encounter Date: 11/07/2017    Admit to Home Health    Diagnoses:  Active Hospital Problems    Diagnosis  POA    Left hip pain [M25.552]  Yes      Resolved Hospital Problems    Diagnosis Date Resolved POA   No resolved problems to display.       Future Appointments  Date Time Provider Department Center   11/20/2017 2:30 PM Yvrose Torres PA-C [facility] ORTHO [address]           I have seen and examined this patient face to face today. My clinical findings that support the need for the home health skilled services and home bound status are the following:  Weakness/numbness causing balance and gait disturbance due to Joint Replacement making it taxing to leave home.    Allergies:Review of patient's allergies indicates:  No Known Allergies    Diet: regular diet    Activities: activity as tolerated and posterior hip precautions with abduction brace at all times    Home Health Admitting Clinician:   SN/PT to complete comprehensive assessment including routine vital signs. Instruct on disease process and s/s of complications to report to MD. Follow specific home health arthoplasty protocol. Review/verify medication list sent home with the patient at time of discharge  and instruct patient/caregiver as needed. If coumadin ordered, coumadin clinic to manage INR with INR draws 2x per week with a goal to maintain INR between 1.8 and 2.2. Frequency may be adjusted depending on start of care date.    Notify MD if SBP > 160 or < 90; DBP > 90 or < 50; HR > 120 or < 50; Temp > 101    Home Medical Equipment:  Walker, 3-1 bedside commode, transfer tub bench    CONSULTS:    Physical Therapy may admit if patient not on coumadin, PT to perform comprehensive assessment if performing admit visit and generate therapy  plan of care. Evaluate for home safety and equipment needs; Establish/upgrade home exercise program. Perform/instruct on therapeutic exercises, gait training, transfer training, and Range of Motion.      OTHER: (only select if patient needs other therapy disciplines)      MISCELLANEOUS CARE:      WOUND CARE ORDERS:  Assess Surgical Incision/DSRG each TX  Aquacel AG drsg applied post-op leave on 14 days post op. Call MD if any drainage reaches border to border of drsg horizontally, s/s of infection, temp >101, induration, swelling or redness.  If dressing is removed per MD order, then apply island dressing, change/teach caregiver to perform daily dressing change if island dressing present.    Medications: Review discharge medications with patient and family and provide education.      Current Discharge Medication List      START taking these medications    Details   aspirin 325 MG tablet Take 1 tablet (325 mg total) by mouth 2 (two) times daily.  Qty: 70 tablet, Refills: 0      docusate sodium (COLACE) 100 MG capsule Take 1 capsule (100 mg total) by mouth 2 (two) times daily as needed for Constipation.  Qty: 60 capsule, Refills: 1      ondansetron (ZOFRAN) 8 MG tablet Take 1 tablet (8 mg total) by mouth every 12 (twelve) hours as needed for Nausea.  Qty: 60 tablet, Refills: 0      oxyCODONE-acetaminophen (PERCOCET)  mg per tablet Take 1 tablet by mouth every 4 (four) hours as needed for Pain.  Qty: 90 tablet, Refills: 0         CONTINUE these medications which have NOT CHANGED    Details   acetaminophen (TYLENOL) 500 MG tablet Take 500 mg by mouth as needed.       BYSTOLIC 5 mg Tab taking 2.5mg every hs  Refills: 1      calcium carbonate (OS-PURNIMA) 600 mg (1,500 mg) Tab Take 600 mg by mouth every 30 days.       calcium carbonate-vitamin D3 (CALCIUM 600 + D,3,) 600 mg calcium- 200 unit Cap Take 600 mg by mouth 2 (two) times daily.       fluconazole (DIFLUCAN) 150 MG Tab Take 150 mg by mouth once. Will take 1 dose  on Thurs 11/2      zolpidem (AMBIEN) 10 mg Tab Take 5 mg by mouth nightly as needed.         STOP taking these medications       aspirin (ECOTRIN) 325 MG EC tablet Comments:   Reason for Stopping:               I certify that this patient is confined to her home and needs intermittent skilled nursing care and physical therapy.

## 2017-11-07 NOTE — BRIEF OP NOTE
Comfortable  Vitals Stable  Dressing Dry/Intact  Bilaterall lower extremities: Palpable DP, good capillary refill  Motor sensation intact  xrays taken today demonstrate a well fixed and positioned NAYANA.  Labs reviewed  S/P Revision NAYANA  Continue current treatment

## 2017-11-07 NOTE — ANESTHESIA PROCEDURE NOTES
CSE    Start time: 11/7/2017 11:42 AM  Timeout: 11/7/2017 11:40 AM  End time: 11/7/2017 11:53 AM  Reason for block: at surgeon's request and post-op pain management  Staffing  Anesthesiologist: ERICA WILLSON  Resident/CRNA: SHALONDA NELSON  Performed: anesthesiologist   Preanesthetic Checklist  Completed: patient identified, site marked, surgical consent, pre-op evaluation, timeout performed, IV checked, risks and benefits discussed and monitors and equipment checked  CSE  Patient position: sitting  Prep: ChloraPrep  Patient monitoring: heart rate, cardiac monitor, continuous pulse ox, continuous capnometry and frequent blood pressure checks  Approach: midline  Spinal Needle  Needle type: Wyatt   Needle gauge: 25 G  Needle length: 5 in  Epidural Needle  Injection technique: IFEANYI air  Needle type: Tuohy   Needle gauge: 17 G  Needle length: 3.5 in  Needle insertion depth: 5 cm  Location: L3-4  Needle localization: anatomical landmarks  Catheter  Catheter type: springwCubicle  Catheter size: 19 G  Catheter at skin depth: 10 cm  Test dose: lidocaine 1.5% with Epi 1-to-200,000  Additional Documentation: negative aspiration for CSF, incremental injection, negative aspiration for heme and no paresthesia on injection  Assessment  Sensory level: T8   Dermatomal levels determined by alcohol swab  Intrathecal Medications:  Bolus administered: 3 mL of 0.5 bupivacaine  administered: primary anesthetic mcg of

## 2017-11-07 NOTE — INTERVAL H&P NOTE
Monet Joshua was interviewed, examined and the H and P reviewed.  There has been no interval change in her History and Physical.

## 2017-11-07 NOTE — ANESTHESIA RELEASE NOTE
"Anesthesia Release from PACU Note    Patient: Monet Lewis    Procedure(s) Performed: Procedure(s) (LRB):  REVISION-ARTHROPLASTY-HIP-TOTAL (Left)    Anesthesia type: CSE    Post pain: Adequate analgesia    Post assessment: no apparent anesthetic complications    Last Vitals:   Visit Vitals  /61   Pulse 71   Temp 36.6 °C (97.8 °F) (Axillary)   Resp 20   Ht 5' 4" (1.626 m)   Wt 77.1 kg (170 lb)   SpO2 98%   Breastfeeding? No   BMI 29.18 kg/m²       Post vital signs: stable    Level of consciousness: awake, alert  and oriented    Nausea/Vomiting: no nausea/no vomiting    Complications: none    Airway Patency: patent    Respiratory: unassisted, room air    Cardiovascular: stable and blood pressure at baseline    Hydration: euvolemic  "

## 2017-11-08 VITALS
HEART RATE: 71 BPM | TEMPERATURE: 97 F | RESPIRATION RATE: 18 BRPM | HEIGHT: 64 IN | OXYGEN SATURATION: 93 % | SYSTOLIC BLOOD PRESSURE: 125 MMHG | WEIGHT: 170.38 LBS | DIASTOLIC BLOOD PRESSURE: 66 MMHG | BODY MASS INDEX: 29.09 KG/M2

## 2017-11-08 LAB — HGB BLD-MCNC: 11.3 G/DL

## 2017-11-08 PROCEDURE — 97165 OT EVAL LOW COMPLEX 30 MIN: CPT

## 2017-11-08 PROCEDURE — 25000003 PHARM REV CODE 250: Performed by: STUDENT IN AN ORGANIZED HEALTH CARE EDUCATION/TRAINING PROGRAM

## 2017-11-08 PROCEDURE — 97161 PT EVAL LOW COMPLEX 20 MIN: CPT

## 2017-11-08 PROCEDURE — 97530 THERAPEUTIC ACTIVITIES: CPT

## 2017-11-08 PROCEDURE — 97535 SELF CARE MNGMENT TRAINING: CPT

## 2017-11-08 PROCEDURE — 63600175 PHARM REV CODE 636 W HCPCS: Performed by: STUDENT IN AN ORGANIZED HEALTH CARE EDUCATION/TRAINING PROGRAM

## 2017-11-08 PROCEDURE — 63600175 PHARM REV CODE 636 W HCPCS: Performed by: ORTHOPAEDIC SURGERY

## 2017-11-08 PROCEDURE — 36415 COLL VENOUS BLD VENIPUNCTURE: CPT

## 2017-11-08 PROCEDURE — 97116 GAIT TRAINING THERAPY: CPT

## 2017-11-08 PROCEDURE — 01996 DLY HOSP MGMT EDRL RX ADMIN: CPT | Mod: ,,, | Performed by: ANESTHESIOLOGY

## 2017-11-08 PROCEDURE — 99900035 HC TECH TIME PER 15 MIN (STAT)

## 2017-11-08 PROCEDURE — A4216 STERILE WATER/SALINE, 10 ML: HCPCS | Performed by: STUDENT IN AN ORGANIZED HEALTH CARE EDUCATION/TRAINING PROGRAM

## 2017-11-08 PROCEDURE — 85018 HEMOGLOBIN: CPT

## 2017-11-08 RX ORDER — NALBUPHINE HYDROCHLORIDE 10 MG/ML
10 INJECTION, SOLUTION INTRAMUSCULAR; INTRAVENOUS; SUBCUTANEOUS
Status: DISCONTINUED | OUTPATIENT
Start: 2017-11-08 | End: 2017-11-08 | Stop reason: HOSPADM

## 2017-11-08 RX ADMIN — OXYCODONE HYDROCHLORIDE 10 MG: 5 TABLET ORAL at 07:11

## 2017-11-08 RX ADMIN — CELECOXIB 200 MG: 100 CAPSULE ORAL at 08:11

## 2017-11-08 RX ADMIN — ASPIRIN 325 MG ORAL TABLET 325 MG: 325 PILL ORAL at 08:11

## 2017-11-08 RX ADMIN — OXYCODONE HYDROCHLORIDE 15 MG: 5 TABLET ORAL at 11:11

## 2017-11-08 RX ADMIN — MUPIROCIN 1 G: 20 OINTMENT TOPICAL at 08:11

## 2017-11-08 RX ADMIN — DIPHENHYDRAMINE HYDROCHLORIDE 12.5 MG: 50 INJECTION, SOLUTION INTRAMUSCULAR; INTRAVENOUS at 05:11

## 2017-11-08 RX ADMIN — CEFAZOLIN SODIUM 2 G: 2 SOLUTION INTRAVENOUS at 05:11

## 2017-11-08 RX ADMIN — SODIUM CHLORIDE, PRESERVATIVE FREE 3 ML: 5 INJECTION INTRAVENOUS at 02:11

## 2017-11-08 RX ADMIN — STANDARDIZED SENNA CONCENTRATE AND DOCUSATE SODIUM 1 TABLET: 8.6; 5 TABLET, FILM COATED ORAL at 08:11

## 2017-11-08 RX ADMIN — ACETAMINOPHEN 1000 MG: 500 TABLET ORAL at 12:11

## 2017-11-08 RX ADMIN — ACETAMINOPHEN 1000 MG: 500 TABLET ORAL at 05:11

## 2017-11-08 RX ADMIN — SODIUM CHLORIDE, PRESERVATIVE FREE 3 ML: 5 INJECTION INTRAVENOUS at 05:11

## 2017-11-08 RX ADMIN — POLYETHYLENE GLYCOL 3350 17 G: 17 POWDER, FOR SOLUTION ORAL at 08:11

## 2017-11-08 RX ADMIN — FAMOTIDINE 20 MG: 20 TABLET, FILM COATED ORAL at 08:11

## 2017-11-08 RX ADMIN — OXYCODONE HYDROCHLORIDE 15 MG: 5 TABLET ORAL at 02:11

## 2017-11-08 NOTE — PROGRESS NOTES
[facility]  Orthopedics  Progress Note    Patient Name: Monet Lewis  MRN: [id number]  Admission Date: 11/7/2017  Hospital Length of Stay: 1 days  Attending Provider: Jacky Baker MD  Primary Care Provider: Primary Doctor No  Follow-up For: Procedure(s) (LRB):  REVISION-ARTHROPLASTY-HIP-TOTAL (Left)    Post-Operative Day: 1 Day Post-Op  Subjective:     Principal Problem:<principal problem not specified>    Principal Orthopedic Problem: s/p LTHA with Limb length discrepancy    Interval History: NAEO, Pain controlled. Patient reports itching overnight relieved by benadryl. No CP, SOB, N/V    Review of patient's allergies indicates:  No Known Allergies    Current Facility-Administered Medications   Medication    0.9%  NaCl infusion (for blood administration)    0.9%  NaCl infusion    acetaminophen tablet 1,000 mg    aspirin tablet 325 mg    bisacodyl suppository 10 mg    cefazolin (ANCEF) 2 gram in dextrose 5% 50 mL IVPB (premix)    celecoxib capsule 200 mg    diphenhydrAMINE injection 12.5 mg    famotidine tablet 20 mg    fentaNYL 5 mcg/mL / ROPIvacaine 0.1% in 250mL 0.9% Sodium Chloride 250mL premixed Bag    morphine injection 2 mg    morphine injection 2 mg    morphine injection 2 mg    mupirocin 2 % ointment 1 g    naloxone 0.4 mg/mL injection 0.02 mg    nebivolol tablet 5 mg    ondansetron injection 4 mg    oxyCODONE immediate release tablet 10 mg    oxyCODONE immediate release tablet 15 mg    oxyCODONE immediate release tablet 5 mg    polyethylene glycol packet 17 g    pregabalin capsule 150 mg    ramelteon tablet 8 mg    senna-docusate 8.6-50 mg per tablet 1 tablet    sodium chloride 0.9% flush 3 mL    sodium chloride 0.9% flush 3 mL     Objective:     Vital Signs (Most Recent):  Temp: 97.5 °F (36.4 °C) (11/08/17 0539)  Pulse: 71 (11/08/17 0539)  Resp: 17 (11/08/17 0539)  BP: (!) 104/56 (11/08/17 0539)  SpO2: 95 % (11/08/17 0539) Vital Signs (24h  "Range):  Temp:  [96 °F (35.6 °C)-98.2 °F (36.8 °C)] 97.5 °F (36.4 °C)  Pulse:  [62-87] 71  Resp:  [13-23] 17  SpO2:  [92 %-99 %] 95 %  BP: ()/(50-89) 104/56     Weight: 77.3 kg (170 lb 5.6 oz)  Height: 5' 4" (162.6 cm)  Body mass index is 29.24 kg/m².      Intake/Output Summary (Last 24 hours) at 11/08/17 0558  Last data filed at 11/07/17 1803   Gross per 24 hour   Intake             5346 ml   Output             3500 ml   Net             1846 ml       Ortho/SPM Exam   PE:    AA&O x 4.  NAD  HEENT:  NCAT, sclera nonicteric  Lungs:  Respirations are equal and unlabored.  CV:  2+ bilateral upper and lower extremity pulses.  Skin:  Intact throughout.    MSK:    LLE:    Dressings C/D/I - No signs of infectious process  SILT +ve  Motor intact distal  Capillary Refill WNL            Significant Labs: All pertinent labs within the past 24 hours have been reviewed.    Significant Imaging: I have reviewed all pertinent imaging results/findings.    Assessment/Plan:     Left hip pain    A/P: Monet Lewis is a 62 y.o. POD 1 s/p LTHR    Weight Bearing Status: PWB in abduction brace    Continue Current Pain Control Regimen  Continue with Current Physical Therapy Regimen  Continue DVT Prophylaxis - ASA 325mg BID    Dispo: PT/OT today in brace. Discharge planning pending PT              Coronary artery disease involving native coronary artery of native heart without angina pectoris    Home Regimen        Essential hypertension    Home Regimen              Lionel Fierro MD  Orthopedics  [facility]  "

## 2017-11-08 NOTE — PT/OT/SLP EVAL
Occupational Therapy  Evaluation    Monet Lewis   MRN: [id number]   Admitting Diagnosis: L hip pain    OT Date of Treatment: 11/08/17   OT Start Time: 1301  OT Stop Time: 1334  OT Total Time (min): 33 min    Billable Minutes:  Evaluation 10  Self Care/Home Management 15  Therapeutic Activity 8    Diagnosis:    S/p L NAYANA rev POD1    Past Medical History:   Diagnosis Date    Arthritis     Hypertension     Myocardial infarction       Past Surgical History:   Procedure Laterality Date    CARDIAC SURGERY      HIP SURGERY      HYSTERECTOMY         General Precautions: Standard, fall  Orthopedic Precautions: LLE posterior precautions, LLE partial weight bearing  Braces: Hip abduction brace    Patient History:  Living Environment  Living Environment Comment: Pt lives with spouse in [facility] with 1 SCOT.  able to provide Spv/(A) upon discharge  Equipment Currently Used at Home: walker, rolling, bedside commode    Prior level of function:   Bed Mobility/Transfers: independent  Grooming: independent  Bathing: independent  Upper Body Dressing: independent  Lower Body Dressing: independent  Toileting: independent    Subjective:  Communicated with RN prior to session.    Pt agreeable to Evaluation     Pain/Comfort  Pain Rating 1: 5/10  Location - Side 1: Left  Location 1: hip  Pain Addressed 1: Pre-medicate for activity, Reposition, Distraction    Objective:  Patient found with: FCD, hip abduction brace    Upper Extremity Range of Motion:  Right Upper Extremity: WFL  Left Upper Extremity: WFL    Upper Extremity Strength:  Right Upper Extremity: WFL  Left Upper Extremity: WFL    Functional Mobility:  Transfers:  Sit <> Stand Assistance: Stand By Assistance  Sit <> Stand Assistive Device: Rolling Walker    Toilet Transfer Assistance: Stand By Assistance  Toilet Transfer Assistive Device: Rolling Walker    Functional Ambulation: SBA with RW within room    Activities of Daily Living:  UE Dressing Level of Assistance: Stand by  "assistance    LE Dressing Level of Assistance: Minimum assistance  Doff/apoorva B socks; apoorva pants    Grooming Position: Standing at sink  Grooming Level of Assistance: Stand by assistance     Toileting Where Assessed: Toilet  Toileting Level of Assistance: Stand by assistance    Therapeutic Activities and Exercises:  Pt/Family educated on:  · OT role/POC  · Posterior hip precautions  · Importance of OOB activity to improve functional outcomes following sx  · Incorporating hip precautions into functional movement  · Rationale behind hip precautions following NAYANA  Maintaining hip precautions upon returning home, safety, and adaptations with ADLs/IADLs with RW  Safety within the home environment  Importance of keeping incision site dry until cleared by MD  Components of Hip Kit and use of AD with LE dressing  Provided with handout for maintaining posterior hip precautions with sexual activity once appropriate to resume    AM-PAC 6 CLICK ADL  How much help from another person does this patient currently need?  1 = Unable, Total/Dependent Assistance  2 = A lot, Maximum/Moderate Assistance  3 = A little, Minimum/Contact Guard/Supervision  4 = None, Modified [place]/Independent    Putting on and taking off regular lower body clothing? : 3  Bathing (including washing, rinsing, drying)?: 3  Toileting, which includes using toilet, bedpan, or urinal? : 3  Putting on and taking off regular upper body clothing?: 3  Taking care of personal grooming such as brushing teeth?: 4  Eating meals?: 4  Total Score: 20    AM-PAC Raw Score CMS "G-Code Modifier Level of Impairment Assistance   6 % Total / Unable   7 - 9 CM 80 - 100% Maximal Assist   10-14 CL 60 - 80% Moderate Assist   15 - 19 CK 40 - 60% Moderate Assist   20 - 22 CJ 20 - 40% Minimal Assist   23 CI 1-20% SBA / CGA   24 CH 0% Independent/ Mod I       Patient left up in chair with all lines intact, call button in reach and RN notified    Assessment:  Monet Lewis " is a 62 y.o. female with a medical diagnosis of s/p L NAYANA rev and presents with decreased function of L LE impeding her ability to perform ADLs and functional mobility and would benefit from OT services to maximize functional (I) and safety.    Rehab identified problem list/impairments: Rehab identified problem list/impairments: weakness, impaired endurance, impaired self care skills, impaired functional mobilty, gait instability, impaired balance, decreased lower extremity function, decreased safety awareness, pain, decreased ROM, impaired skin, edema, orthopedic precautions    Rehab potential is good.    Activity tolerance: Good    Discharge recommendations: Discharge Facility/Level Of Care Needs: home with home health     Barriers to discharge: Barriers to Discharge: None    Equipment recommendations:  (hip kit)     GOALS:    Occupational Therapy Goals        Problem: Occupational Therapy Goal    Goal Priority Disciplines Outcome Interventions   Occupational Therapy Goal     OT, PT/OT     Description:  Goals to be met by: 7 days    Patient will increase functional independence with ADLs by performing:    UE Dressing with Supervision.  LE Dressing with Supervision with AD as needed.  Grooming while standing with Supervision.  Toileting from bedside commode with Supervision for hygiene and clothing management.   Stand pivot transfers with Supervision.  Toilet transfer to bedside commode with Supervision.                         PLAN:  Patient to be seen 6 x/week to address the above listed problems via self-care/home management, therapeutic activities, therapeutic exercises   Plan of Care reviewed with: patient, spouse      KAREN Lopez  11/08/2017

## 2017-11-08 NOTE — ADDENDUM NOTE
Addendum  created 11/08/17 1111 by Arya Low MD    Anesthesia Intra LDAs edited, LDA properties accepted, Sign clinical note

## 2017-11-08 NOTE — NURSING
Pt admitted to floor, stable, VSS, no complaints at this time noted or stated, I.S at bedside, SCDs intact, will hand over to nurse. Ashely Lewis RN

## 2017-11-08 NOTE — ASSESSMENT & PLAN NOTE
A/P: Monet Lewis is a 62 y.o. POD 1 s/p LTHR    Weight Bearing Status: PWB in abduction brace    Continue Current Pain Control Regimen  Continue with Current Physical Therapy Regimen  Continue DVT Prophylaxis - ASA 325mg BID    Dispo: PT/OT today in brace. Discharge planning pending PT

## 2017-11-08 NOTE — PROGRESS NOTES
Nursing Transfer Note      11/7/2017     Transfer To: 542a    Transfer via stretcher    Transfer with room air    Transported by PCT    Medicines sent: IVF, PCEA    Chart send with patient: Yes    Notified: spouse    Patient reassessed at: to be done by receiving RN (date, time)

## 2017-11-08 NOTE — PROGRESS NOTES
PRIMITIVO ordered a hip kit from [facility] with [facility]. DME for bedside delivery.    Raquel Collazo RN, CM [facility]  [phone] -[phone]

## 2017-11-08 NOTE — PROGRESS NOTES
Pt discharged to home via wheelchair. Pt denies pain at the present time. Condition stable. Follows commands. IV removed and catheter intact. Pt given prescriptions and copy of discharge home care instructions. Pt verbalized understanding of activity limitations and s/s of when to call the Dr. Pt also given information on followup appointment. All belongings with the pt. Pt verbalized understanding of all discharge instructions.  at bedside. Pt waiting for transport.

## 2017-11-08 NOTE — PT/OT/SLP PROGRESS
"Physical Therapy  PM Treatment/Discharge    Monet Lewis   MRN: [id number]   Admitting Diagnosis: Left hip pain    PT Received On: 11/08/17  PT Start Time: 1420     PT Stop Time: 1447    PT Total Time (min): 27 min       Billable Minutes:  Gait [id number] and Therapeutic Activity 14    Treatment Type: Treatment  PT/PTA: PT             General Precautions: Standard, fall  Orthopedic Precautions: LLE posterior precautions, RLE partial weight bearing   Braces: Hip abduction brace    Do you have any cultural, spiritual, Episcopal conflicts, given your current situation?: none stated    Subjective:  Communicated with nsg prior to session.  -Pt agreeable to PT session    Pain/Comfort  Pain Rating 1: 4/10  Location - Side 1: Left  Location - Orientation 1: generalized  Location 1: hip  Pain Addressed 1: Pre-medicate for activity  Pain Rating Post-Intervention 1: 4/10    Objective:   Patient found with: FCD, hip abduction brace    Functional Mobility:  Bed Mobility:   Scooting/Bridging: Stand by Assistance  Supine to Sit: Contact Guard Assistance, With leg lift  Sit to Supine: Contact Guard Assistance, With leg lift    Transfers:  Sit <> Stand Assistance: Stand By Assistance  Sit <> Stand Assistive Device: Rolling Walker  Bed <> Chair Technique: Stand Pivot  Bed <> Chair Assistance: Stand By Assistance  Bed <> Chair Assistive Device: Rolling Walker    Gait:   Gait Distance: 95' x1 trial, 25' x1 trial  Assistance 1: Stand by Assistance  Gait Assistive Device: Rolling walker  Gait Pattern: 3-point gait  Gait Deviation(s): decreased luis, increased time in double stance, decreased velocity of limb motion, decreased step length, decreased stride length, decreased toe-to-floor clearance, decreased weight-shifting ability, foot flat    Stairs:  Pt ascended/descend 7" curb step with Rolling Walker with no and handrails with Contact Guard Assistance.     Balance:   Static Sit: FAIR+: Able to take MINIMAL challenges from all " directions  Dynamic Sit: FAIR+: Maintains balance through MINIMAL excursions of active trunk motion  Static Stand: FAIR+: Takes MINIMAL challenges from all directions  Dynamic stand: FAIR+: Needs CLOSE SUPERVISION during gait and is able to right self with minor LOB     Therapeutic Activities and Exercises:  -Pt performed THR protocol exercises x30 reps of:  JANIE BELCHER  B. GS  C. QS    -Pt educated on:  A. PT POC and role of PT  B. Importance of OOB activity to improve functional outcomes after surgery  C. Posterior hip precautions  D. Protecting surgical incision during t/f's  E. DME mgmt and 3-point gait sequence  F. S/s associated with THR procedure  G. Car t/f and sequencing  H. Curb step training       AM-PAC 6 CLICK MOBILITY  How much help from another person does this patient currently need?   1 = Unable, Total/Dependent Assistance  2 = A lot, Maximum/Moderate Assistance  3 = A little, Minimum/Contact Guard/Supervision  4 = None, Modified [place]/Independent    Turning over in bed (including adjusting bedclothes, sheets and blankets)?: 4  Sitting down on and standing up from a chair with arms (e.g., wheelchair, bedside commode, etc.): 4  Moving from lying on back to sitting on the side of the bed?: 4  Moving to and from a bed to a chair (including a wheelchair)?: 4  Need to walk in hospital room?: 3  Climbing 3-5 steps with a railing?: 3  Total Score: 22    AM-PAC Raw Score CMS G-Code Modifier Level of Impairment Assistance   6 % Total / Unable   7 - 9 CM 80 - 100% Maximal Assist   10 - 14 CL 60 - 80% Moderate Assist   15 - 19 CK 40 - 60% Moderate Assist   20 - 22 CJ 20 - 40% Minimal Assist   23 CI 1-20% SBA / CGA   24 CH 0% Independent/ Mod I     Patient left up in chair with all lines intact, call button in reach and nsg notified.    Assessment:  Monet Lewis is a 62 y.o. female with a medical diagnosis of Left hip pain and presents with impaired functional mobility and (L) LE weakness. Pt  tolerated session well this PM with no complications. Pt demonstrated improved stability with all functional tasks and was able to follow hip precautions and PWB status during mobility. Pt performed successful car t/f simulation with no difficulty. Pt safe to d/c home with HHPT at this time.    Rehab identified problem list/impairments: Rehab identified problem list/impairments: weakness, impaired endurance, impaired self care skills, impaired balance, gait instability, decreased lower extremity function, pain, impaired joint extensibility, impaired skin, edema, orthopedic precautions    Rehab potential is good.    Activity tolerance: Good    Discharge recommendations: Discharge Facility/Level Of Care Needs: home with home health     Barriers to discharge: Barriers to Discharge: None    Equipment recommendations: Equipment Needed After Discharge: none (DME in place)     GOALS:    Physical Therapy Goals     Not on file          Multidisciplinary Problems (Resolved)        Problem: Physical Therapy Goal    Goal Priority Disciplines Outcome Goal Variances Interventions   Physical Therapy Goal   (Resolved)     PT/OT, PT Outcome(s) achieved     Description:  Goals to be met by: 11/15/17     Patient will increase functional independence with mobility by performin. Supine to sit with Set-up [place]  2. Sit to supine with Set-up [place]  3. Sit to stand transfer with Supervision  4. Bed to chair transfer with Stand-by Assistance using Rolling Walker  5. Gait  x 100 feet with Stand-by Assistance using Rolling Walker.   6. Ascend/Descend 7 inch curb step with Contact Guard Assistance using Rolling Walker.  7. Lower extremity exercise program x20-30 reps per handout, with independence                         PLAN:  Pt to be d/c'd from therapy services         Avery Maya, PT  2017

## 2017-11-08 NOTE — PLAN OF CARE
10:19 AM  SW received home health orders. Pt's preference is [facility]. Faxed orders to [facility].     Diane Pack LMSW   [facility]  Ext [phone]

## 2017-11-08 NOTE — SUBJECTIVE & OBJECTIVE
"Principal Problem:<principal problem not specified>    Principal Orthopedic Problem: s/p LTHA with Limb length discrepancy    Interval History: NAEO, Pain controlled. Patient reports itching overnight relieved by benadryl. No CP, SOB, N/V    Review of patient's allergies indicates:  No Known Allergies    Current Facility-Administered Medications   Medication    0.9%  NaCl infusion (for blood administration)    0.9%  NaCl infusion    acetaminophen tablet 1,000 mg    aspirin tablet 325 mg    bisacodyl suppository 10 mg    cefazolin (ANCEF) 2 gram in dextrose 5% 50 mL IVPB (premix)    celecoxib capsule 200 mg    diphenhydrAMINE injection 12.5 mg    famotidine tablet 20 mg    fentaNYL 5 mcg/mL / ROPIvacaine 0.1% in 250mL 0.9% Sodium Chloride 250mL premixed Bag    morphine injection 2 mg    morphine injection 2 mg    morphine injection 2 mg    mupirocin 2 % ointment 1 g    naloxone 0.4 mg/mL injection 0.02 mg    nebivolol tablet 5 mg    ondansetron injection 4 mg    oxyCODONE immediate release tablet 10 mg    oxyCODONE immediate release tablet 15 mg    oxyCODONE immediate release tablet 5 mg    polyethylene glycol packet 17 g    pregabalin capsule 150 mg    ramelteon tablet 8 mg    senna-docusate 8.6-50 mg per tablet 1 tablet    sodium chloride 0.9% flush 3 mL    sodium chloride 0.9% flush 3 mL     Objective:     Vital Signs (Most Recent):  Temp: 97.5 °F (36.4 °C) (11/08/17 0539)  Pulse: 71 (11/08/17 0539)  Resp: 17 (11/08/17 0539)  BP: (!) 104/56 (11/08/17 0539)  SpO2: 95 % (11/08/17 0539) Vital Signs (24h Range):  Temp:  [96 °F (35.6 °C)-98.2 °F (36.8 °C)] 97.5 °F (36.4 °C)  Pulse:  [62-87] 71  Resp:  [13-23] 17  SpO2:  [92 %-99 %] 95 %  BP: ()/(50-89) 104/56     Weight: 77.3 kg (170 lb 5.6 oz)  Height: 5' 4" (162.6 cm)  Body mass index is 29.24 kg/m².      Intake/Output Summary (Last 24 hours) at 11/08/17 0517  Last data filed at 11/07/17 6757   Gross per 24 hour   Intake             " 5346 ml   Output             3500 ml   Net             1846 ml       Ortho/SPM Exam   PE:    AA&O x 4.  NAD  HEENT:  NCAT, sclera nonicteric  Lungs:  Respirations are equal and unlabored.  CV:  2+ bilateral upper and lower extremity pulses.  Skin:  Intact throughout.    MSK:    LLE:    Dressings C/D/I - No signs of infectious process  SILT +ve  Motor intact distal  Capillary Refill WNL            Significant Labs: All pertinent labs within the past 24 hours have been reviewed.    Significant Imaging: I have reviewed all pertinent imaging results/findings.

## 2017-11-08 NOTE — ADDENDUM NOTE
Addendum  created 11/08/17 1117 by Mirta Hong MD    Charge Capture section accepted, Sign clinical note

## 2017-11-08 NOTE — PLAN OF CARE
POD 1 s/p left NAYANA revision. PT/OT ordered to eval and treat. PT/OT recs pending. Patient currently lives with her spouse. Patient's spouse is present at the bedside. Patient has good family support. CM completed discharge assessment and planning with patient. Patient and family verbalized understanding. All questions and concerns addressed. SW and CM will continue to follow for any additional needs. Plan A to discharge home with home health as soon as medically stable. Plan B to discharge home with family support and plans for outpatient rehab.    Patient requested [facility]. Patient owns all necessary DME and refused shower chair. Patient stated she will use [facility] for outpatient rehab services after home health.    PCP: Richie Espinoza DO    Pharmacy:   [id number]  [place], [place] - [address] AT [facility] & [address]  [address]  [place] [address]  Phone: [phone] Fax: [phone]    Payor: BLUE CROSS BLUE SHIELD / Plan: BCBS ALL OUT OF STATE / Product Type: PPO /      11/08/17 0935   Discharge Assessment   Assessment Type Discharge Planning Assessment   Confirmed/corrected address and phone number on facesheet? Yes   Assessment information obtained from? Patient;Caregiver;Medical Record   Expected Length of Stay (days) 2   Communicated expected length of stay with patient/caregiver yes   Prior to hospitilization cognitive status: Alert/Oriented   Prior to hospitalization functional status: Assistive Equipment   Current cognitive status: Alert/Oriented   Current Functional Status: Assistive Equipment   Lives With spouse   Able to Return to Prior Arrangements yes   Is patient able to care for self after discharge? Yes   Who are your caregiver(s) and their phone number(s)? spouse- Nick Lewis #[phone]   Patient's perception of discharge disposition home health   Readmission Within The Last 30 Days no previous admission in last 30 days   Patient  currently being followed by outpatient case management? No   Patient currently receives any other outside agency services? No   Equipment Currently Used at Home walker, rolling;bedside commode   Do you have any problems affording any of your prescribed medications? No   Is the patient taking medications as prescribed? yes   Does the patient have transportation home? Yes   Transportation Available family or friend will provide   Does the patient receive services at the [facility]? No   Discharge Plan A Home Health;Home with family   Discharge Plan B Home with family;Other  (outpatient rehab)   Patient/Family In Agreement With Plan yes

## 2017-11-08 NOTE — PLAN OF CARE
Problem: Occupational Therapy Goal  Goal: Occupational Therapy Goal  Goals to be met by: 7 days    Patient will increase functional independence with ADLs by performing:    UE Dressing with Supervision.  LE Dressing with Supervision with AD as needed.  Grooming while standing with Supervision.  Toileting from bedside commode with Supervision for hygiene and clothing management.   Stand pivot transfers with Supervision.  Toilet transfer to bedside commode with Supervision.         KAREN Lopez  11/8/2017

## 2017-11-08 NOTE — PT/OT/SLP DISCHARGE
Physical Therapy Discharge Summary    Monet Lewis  MRN: [id number]   Left hip pain   Patient Discharged from acute Physical Therapy on 17.  Please refer to prior PT noted date on 17 for functional status.     Assessment:   Patient has met all goals and is not appropriate for therapy.  GOALS:    Physical Therapy Goals     Not on file          Multidisciplinary Problems (Resolved)        Problem: Physical Therapy Goal    Goal Priority Disciplines Outcome Goal Variances Interventions   Physical Therapy Goal   (Resolved)     PT/OT, PT Outcome(s) achieved     Description:  Goals to be met by: 11/15/17     Patient will increase functional independence with mobility by performin. Supine to sit with Set-up [place]  2. Sit to supine with Set-up [place]  3. Sit to stand transfer with Supervision  4. Bed to chair transfer with Stand-by Assistance using Rolling Walker  5. Gait  x 100 feet with Stand-by Assistance using Rolling Walker.   6. Ascend/Descend 7 inch curb step with Contact Guard Assistance using Rolling Walker.  7. Lower extremity exercise program x20-30 reps per handout, with independence                       Reasons for Discontinuation of Therapy Services  Satisfactory goal achievement.      Plan:  Patient Discharged to: Home with Home Health Service   Avery Maya, PT  .

## 2017-11-08 NOTE — DISCHARGE SUMMARY
[facility]  Orthopedics  Discharge Summary      Patient Name: Monet Lewis  MRN: [id number]  Admission Date: 11/7/2017  Hospital Length of Stay: 1 days  Discharge Date and Time:  11/08/2017 2:52 PM  Attending Physician: Jacky Baker MD   Discharging Provider: Leonel Huber MD  Primary Care Provider: Richie Espinoza DO    HPI:   No notes on file    Procedure(s) (LRB):  REVISION-ARTHROPLASTY-HIP-TOTAL (Left)      Hospital Course:  On 11/7, the patient arrived to the [facility] on the second floor of [facility] for proper pre-operative management.  Upon completion of pre-operative preparation, the patient was taken back to the operative theatre.  A revision NAYANA was performed without complication and the patient was transported to the post anesthesia care unit in stable condition.   Jorgensen: yes    Drain: nil    Pain Management:     - [facility]: yes    After appropriate recovery from the anaesthetic agents used during the surgery the patient was transported to the IP floor for overnight observation. The duration of said observation period was without complication and the patient was discharged home on post-operative day one without complication.        Consults         Status Ordering Provider     Inpatient consult to Pain Management  Once     Provider:  (Not yet assigned)    Acknowledged LEONEL HUBER     IP consult case management/social work  Once     Provider:  (Not yet assigned)    Acknowledged LEONEL HUBER     IP consult case management/social work  Once     Provider:  (Not yet assigned)    Acknowledged LEONEL HUBER          Significant Diagnostic Studies:     Pending Diagnostic Studies:     None        Final Active Diagnoses:    Diagnosis Date Noted POA    PRINCIPAL PROBLEM:  Left hip pain [M25.552] 11/07/2017 Yes    Essential hypertension [I10] 10/30/2017 Yes    Coronary artery disease involving native coronary artery of native heart without  "angina pectoris [I25.10] 10/30/2017 Yes      Problems Resolved During this Admission:    Diagnosis Date Noted Date Resolved POA      Discharged Condition: stable    Disposition: Home or Self Care    Follow Up:  Follow-up Information     Jacky Baker MD In 2 weeks.    Specialty:  Orthopedic Surgery  Why:  For wound re-check  Contact information:  [address]  [facility] [address]  [phone]                 Patient Instructions:     COMMODE FOR HOME USE   Order Specific Question Answer Comments   Type: Standard    Height: 5' 4" (1.626 m)    Weight: 77.1 kg (170 lb)    Does patient have medical equipment at home? walker, rolling    Does patient have medical equipment at home? bedside commode    Length of need (1-99 months): 99    Vendor: Other (use comments) Patient have equipment at home   Expected Date of Delivery: 11/8/2017      WALKER FOR HOME USE   Order Specific Question Answer Comments   Type of Walker: Adult (5'4"-6'6")    With wheels? No    Height: 5' 4" (1.626 m)    Weight: 77.1 kg (170 lb)    Length of need (1-99 months): 99    Does patient have medical equipment at home? walker, rolling    Does patient have medical equipment at home? bedside commode    Vendor: Other (use comments) Patient have equipment at home   Expected Date of Delivery: 11/8/2017      TRANSFER TUB BENCH FOR HOME USE   Order Specific Question Answer Comments   Type of Transfer Tub Bench: Padded    Height: 5' 4" (1.626 m)    Weight: 77.1 kg (170 lb)    Does patient have medical equipment at home? walker, rolling    Does patient have medical equipment at home? bedside commode    Length of need (1-99 months): 99    Vendor: Other (use comments) Patient have equipment at home   Expected Date of Delivery: 11/8/2017      HME - OTHER   Order Specific Question Answer Comments   Type of Equipment: hip kit    Height: 5' 4" (1.626 m)    Weight: 77.3 kg (170 lb 5.6 oz)    Does patient have medical equipment at home? walker, rolling  "   Does patient have medical equipment at home? bedside commode      Diet general     Sponge bath only until clinic visit     Ice to affected area     Weight bearing restrictions (specify)   Order Comments: PWB in abduction brace at all times     Call MD for:  increased confusion or weakness     Call MD for:  persistent dizziness, light-headedness, or visual disturbances     Call MD for:  severe persistent headache     Call MD for:  worsening rash     Call MD for:  difficulty breathing or increased cough     Call MD for:  redness, tenderness, or signs of infection (pain, swelling, redness, odor or green/yellow discharge around incision site)     Call MD for:  severe uncontrolled pain     Call MD for:  persistent nausea and vomiting or diarrhea     Call MD for:  temperature >100.4     Leave dressing on - Keep it clean, dry, and intact until clinic visit       Medications:  Reconciled Home Medications:   Current Discharge Medication List      START taking these medications    Details   aspirin 325 MG tablet Take 1 tablet (325 mg total) by mouth 2 (two) times daily.  Qty: 70 tablet, Refills: 0      docusate sodium (COLACE) 100 MG capsule Take 1 capsule (100 mg total) by mouth 2 (two) times daily as needed for Constipation.  Qty: 60 capsule, Refills: 1      ondansetron (ZOFRAN) 8 MG tablet Take 1 tablet (8 mg total) by mouth every 12 (twelve) hours as needed for Nausea.  Qty: 60 tablet, Refills: 0      oxyCODONE-acetaminophen (PERCOCET)  mg per tablet Take 1 tablet by mouth every 4 (four) hours as needed for Pain.  Qty: 90 tablet, Refills: 0         CONTINUE these medications which have NOT CHANGED    Details   acetaminophen (TYLENOL) 500 MG tablet Take 500 mg by mouth as needed.       BYSTOLIC 5 mg Tab taking 2.5mg every hs  Refills: 1      calcium carbonate (OS-PURNIMA) 600 mg (1,500 mg) Tab Take 600 mg by mouth every 30 days.       calcium carbonate-vitamin D3 (CALCIUM 600 + D,3,) 600 mg calcium- 200 unit Cap Take  600 mg by mouth 2 (two) times daily.       fluconazole (DIFLUCAN) 150 MG Tab Take 150 mg by mouth once. Will take 1 dose on Thurs 11/2      zolpidem (AMBIEN) 10 mg Tab Take 5 mg by mouth nightly as needed.         STOP taking these medications       aspirin (ECOTRIN) 325 MG EC tablet Comments:   Reason for Stopping:               Lionel Fierro MD  Orthopedics  [facility]

## 2017-11-08 NOTE — ANESTHESIA POST-OP PAIN MANAGEMENT
I have seen the patient, reviewed the Resident's assessment and plan. I have personally interviewed and examined the patient at bedside and: agree with the findings.   Pain well controlled with multimodals; d/c home today with ONQ  Tolerating po well  Meeting PT/OT goals  D/c home today         Acute Pain Service Progress Note    Monet Lewis is a 62 y.o., female, [id number].    Surgery:  Revision-Arthroplasty-Hip-Total (Left Hip)      Post Op Day #: 1    Catheter type: epidural L3/L4    Infusion type: Ropivacaine 0.1% with Fentanyl 5mcg/mL  6 mL/Hr basal    Problem List:    Active Hospital Problems    Diagnosis  POA    Left hip pain [M25.552]  Yes    Essential hypertension [I10]  Yes    Coronary artery disease involving native coronary artery of native heart without angina pectoris [I25.10]  Yes      Resolved Hospital Problems    Diagnosis Date Resolved POA   No resolved problems to display.       Subjective:    Patient doing well. Denies pain/n/v/itchiness.        General appearance of alert, oriented, no complaints   Pain with rest: 1    Numbers   Pain with movement: 3    Numbers   Side Effects    1. Pruritis No    2. Nausea No    3. Motor Blockade No, 0=Ability to raise lower extremities off bed    4. Sedation No, 1=awake and alert    Objective:       Catheter site clean, dry, intact. Removed catheter with blue tip intact.         Vitals   Vitals:    11/08/17 0751   BP: 122/64   Pulse: 80   Resp: 18   Temp: 36.3 °C (97.4 °F)        Labs    Admission on 11/07/2017   Component Date Value Ref Range Status    UNIT NUMBER 11/07/2017 [id number]   Preliminary    PRODUCT CODE 11/07/2017 [id number]   Preliminary    DISPENSE STATUS 11/07/2017 CROSSMATCHED   Preliminary    CODING SYSTEM 11/07/2017 [id number]   Preliminary    Unit Blood Type Code 11/07/2017 5100   Preliminary    Unit Blood Type 11/07/2017 O POS   Preliminary    Unit Expiration 11/07/2017 [id number]   Preliminary    UNIT NUMBER 11/07/2017  [id number]   Preliminary    PRODUCT CODE 11/07/2017 [id number]   Preliminary    DISPENSE STATUS 11/07/2017 CROSSMATCHED   Preliminary    CODING SYSTEM 11/07/2017 [id number]   Preliminary    Unit Blood Type Code 11/07/2017 5100   Preliminary    Unit Blood Type 11/07/2017 O POS   Preliminary    Unit Expiration 11/07/2017 [id number]   Preliminary    Anaerobic Culture 11/08/2017 Culture in progress   Preliminary    Aerobic Bacterial Culture 11/08/2017 No growth   Preliminary    Gram Stain Result 11/07/2017 No WBC's   Final    Gram Stain Result 11/07/2017 No organisms seen   Final    Anaerobic Culture 11/08/2017 Culture in progress   Preliminary    Aerobic Bacterial Culture 11/08/2017 No growth   Preliminary    Gram Stain Result 11/07/2017 Rare WBC's   Final    Gram Stain Result 11/07/2017 No organisms seen   Final    Hemoglobin 11/07/2017 12.2  12.0 - 16.0 g/dL Final    Hematocrit 11/07/2017 37.2  37.0 - 48.5 % Final    Hemoglobin 11/08/2017 11.3* 12.0 - 16.0 g/dL Final        Meds   Current Facility-Administered Medications   Medication Dose Route Frequency Provider Last Rate Last Dose    0.9%  NaCl infusion (for blood administration)   Intravenous Q24H PRN Jacky Baker MD        acetaminophen tablet 1,000 mg  1,000 mg Oral Q6H Lionel Fierro MD   1,000 mg at 11/08/17 0542    aspirin tablet 325 mg  325 mg Oral BID Lionel Fierro MD   325 mg at 11/08/17 0832    bisacodyl suppository 10 mg  10 mg Rectal Q12H PRN Lionel Fierro MD        celecoxib capsule 200 mg  200 mg Oral Daily Lionel Fierro MD   200 mg at 11/08/17 0832    diphenhydrAMINE injection 12.5 mg  12.5 mg Intravenous Q4H PRN Jacky Baker MD   12.5 mg at 11/08/17 0543    famotidine tablet 20 mg  20 mg Oral BID Lionel Fierro MD   20 mg at 11/08/17 0832    fentaNYL 5 mcg/mL / ROPIvacaine 0.1% in 250mL 0.9% Sodium Chloride 250mL premixed Bag   Epidural Continuous Gianna Ledesma MD 6 mL/hr at 11/07/17 1512 6  mL/hr at 11/07/17 1512    morphine injection 2 mg  2 mg Intravenous Q1H PRN Lionel Fierro MD        morphine injection 2 mg  2 mg Intravenous Q1H PRN Lionel Fierro MD        morphine injection 2 mg  2 mg Intravenous Q1H PRN Lionel Fierro MD        mupirocin 2 % ointment 1 g  1 g Nasal BID Lionel Fierro MD   1 g at 11/08/17 0833    nalbuphine injection 10 mg  10 mg Intravenous Q3H PRN Arya Low MD        naloxone 0.4 mg/mL injection 0.02 mg  0.02 mg Intravenous PRN Gianna Ledesma MD        nebivolol tablet 5 mg  5 mg Oral Daily Lionel Fierro MD        ondansetron injection 4 mg  4 mg Intravenous Q8H PRN Arya Low MD        oxyCODONE immediate release tablet 10 mg  10 mg Oral Q3H PRN Lionel Fierro MD   10 mg at 11/08/17 0745    oxyCODONE immediate release tablet 15 mg  15 mg Oral Q3H PRN Lionel Fierro MD        oxyCODONE immediate release tablet 5 mg  5 mg Oral Q3H PRN Lionel Fierro MD        polyethylene glycol packet 17 g  17 g Oral Daily Lionel Fierro MD   17 g at 11/08/17 0832    pregabalin capsule 150 mg  150 mg Oral QHS Arya Low MD   150 mg at 11/07/17 2100    ramelteon tablet 8 mg  8 mg Oral Nightly PRN Gianna Ledesma MD        senna-docusate 8.6-50 mg per tablet 1 tablet  1 tablet Oral BID Lionel Fierro MD   1 tablet at 11/08/17 0832    sodium chloride 0.9% flush 3 mL  3 mL Intravenous PRN Gianna Ledesma MD        sodium chloride 0.9% flush 3 mL  3 mL Intravenous Q8H Lionel Fierro MD   3 mL at 11/08/17 0546        Anticoagulant:  mg BID    Assessment:     Pain control adequate    Plan:     Patient is doing well. Pulled catheter this AM w/ blue tip intact. Continue multimodal analgesia. Anesthesia acute pain will sign off. Thank you for allowing us to participate in the care of this patient.     Evaluator Arya Low

## 2017-11-08 NOTE — PLAN OF CARE
"Problem: Physical Therapy Goal  Goal: Physical Therapy Goal  Goals to be met by: 11/15/17     Patient will increase functional independence with mobility by performin. Supine to sit with Set-up [place]  2. Sit to supine with Set-up [place]  3. Sit to stand transfer with Supervision  4. Bed to chair transfer with Stand-by Assistance using Rolling Walker  5. Gait  x 100 feet with Stand-by Assistance using Rolling Walker.   6. Ascend/Descend 7 inch curb step with Contact Guard Assistance using Rolling Walker.  7. Lower extremity exercise program x20-30 reps per handout, with independence      Outcome: Ongoing (interventions implemented as appropriate)  PT eval complete. Pt tolerated session well today with no complications. Pt with no LOB during ambulation and t/f's while maintaining PWB status on surgical LE. Pt able to recall 3/3 hip precautions and followed throughout. Pt with no LOB when ascending/descending 7" curb step using RW. Pt will cont to benefit from skilled therapy services and is appropriate for HHPT upon d/c.      "

## 2017-11-08 NOTE — PT/OT/SLP EVAL
Physical Therapy  Evaluation/Treatment    Monet Lewis   MRN: [id number]   Admitting Diagnosis: (L) hip pain    PT Received On: 11/08/17  PT Start Time: 1040     PT Stop Time: 1105    PT Total Time (min): 25 min       Billable Minutes:  Evaluation 10 and Gait [id number]    Diagnosis: (L) hip pain  S/p (L) NAYANA revision POD #1    Past Medical History:   Diagnosis Date    Arthritis     Hypertension     Myocardial infarction       Past Surgical History:   Procedure Laterality Date    CARDIAC SURGERY      HIP SURGERY      HYSTERECTOMY         Referring physician: Olivia  Date referred to PT: 11/8/2017      General Precautions: Standard, fall  Orthopedic Precautions: LLE partial weight bearing, LLE posterior precautions   Braces: Hip abduction brace       Do you have any cultural, spiritual, Restorationism conflicts, given your current situation?: none stated    Patient History:  Lives With: spouse  Living Arrangements: house, mobile home  Home Layout: Able to live on 1st floor  Number of Stairs to Enter Home: 1  Stair Railings at Home: none  Transportation Available: family or friend will provide  Living Environment Comment: Pt lives with her  in a [facility] with 1 SCOT and no rails. Pt will have 24/7 (A) upon d/c from .   Equipment Currently Used at Home: walker, rolling, bedside commode  DME owned (not currently used):     Previous Level of Function:  Ambulation Skills: independent  Transfer Skills: independent  ADL Skills: independent  Work/Leisure Activity: independent    Subjective:  Communicated with nsg prior to session.  -Pt agreeable to PT session    Chief Complaint: impaired functional mobility and (L) LE weakness  Patient goals: return home to PLOF    Pain/Comfort  Pain Rating 1: 8/10  Location - Side 1: Left  Location - Orientation 1: generalized  Location 1: hip  Pain Addressed 1: Pre-medicate for activity, Cessation of Activity, Reposition  Pain Rating Post-Intervention 1: 6/10      Objective:  "  Patient found with:  (no lines)    Safety awareness/insight to disability: intact    Physical Exam:  Postural examination/scapula alignment: No postural abnormalities identified    Skin integrity: Visible skin intact  Edema: None noted     Sensation:   Intact  light/touch (B) LE    Lower Extremity Range of Motion:  Right Lower Extremity: WFL  Left Lower Extremity: WFL except limits from hip precautions    Lower Extremity Strength:  Right Lower Extremity: WFL  Left Lower Extremity: Deficits: 3+/5 at knee, hip      Functional Mobility:  Bed Mobility:  Scooting/Bridging: Stand by Assistance  Supine to Sit: Contact Guard Assistance, With leg lift  Sit to Supine: Contact Guard Assistance, With leg lift    Transfers:  Sit <> Stand Assistance: Contact Guard Assistance x1 trial from EOB, x1 trial from chair, x1 trial from EOB  Sit <> Stand Assistive Device: Rolling Walker  Bed <> Chair Technique: Stand Pivot  Bed <> Chair Assistance: Contact Guard Assistance  Bed <> Chair Assistive Device: Rolling Walker    Gait:   Gait Distance: 60' x1 trial, 25' x1 trial  Assistance 1: Contact Guard Assistance  Gait Assistive Device: Rolling walker  Gait Pattern: 3-point gait  Gait Deviation(s): decreased luis, increased time in double stance, decreased velocity of limb motion, decreased stride length, decreased step length, decreased toe-to-floor clearance, decreased swing-to-stance ratio    Stairs:  Pt ascended/descend 7" curb step with Rolling Walker with no and handrails with Contact Guard Assistance.     Balance:   Static Sit: FAIR+: Able to take MINIMAL challenges from all directions  Dynamic Sit: FAIR+: Maintains balance through MINIMAL excursions of active trunk motion  Static Stand: FAIR+: Takes MINIMAL challenges from all directions  Dynamic stand: FAIR: Needs CONTACT GUARD during gait    Therapeutic Activities and Exercises:  -Pt performed THR protocol exercises x30 reps of:  JANIE MEDELLIN QS    -Pt educated on:  JANIE PT " "POC and role of PT  B. Importance of OOB activity to improve functional outcomes after surgery  C. Posterior hip precautions  D. Protecting surgical incision during t/f's  E. DME mgmt and 3-point gait sequence  F. S/s associated with THR procedure  G. Curb step training      AM-PAC 6 CLICK MOBILITY  How much help from another person does this patient currently need?   1 = Unable, Total/Dependent Assistance  2 = A lot, Maximum/Moderate Assistance  3 = A little, Minimum/Contact Guard/Supervision  4 = None, Modified [place]/Independent    Turning over in bed (including adjusting bedclothes, sheets and blankets)?: 4  Sitting down on and standing up from a chair with arms (e.g., wheelchair, bedside commode, etc.): 4  Moving from lying on back to sitting on the side of the bed?: 4  Moving to and from a bed to a chair (including a wheelchair)?: 3  Need to walk in hospital room?: 3  Climbing 3-5 steps with a railing?: 3  Total Score: 21     AM-PAC Raw Score CMS G-Code Modifier Level of Impairment Assistance   6 % Total / Unable   7 - 9 CM 80 - 100% Maximal Assist   10 - 14 CL 60 - 80% Moderate Assist   15 - 19 CK 40 - 60% Moderate Assist   20 - 22 CJ 20 - 40% Minimal Assist   23 CI 1-20% SBA / CGA   24 CH 0% Independent/ Mod I     Patient left up in chair with all lines intact, call button in reach and nsg notified.    Assessment:   Monet Lewis is a 62 y.o. female with a medical diagnosis of (L) hip pain and presents with impaired functional mobility and (L) LE weakness. PT eval complete. Pt tolerated session well today with no complications. Pt with no LOB during ambulation and t/f's while maintaining PWB status on surgical LE. Pt able to recall 3/3 hip precautions and followed throughout. Pt with no LOB when ascending/descending 7" curb step using RW. Pt will cont to benefit from skilled therapy services and is appropriate for HHPT upon d/c.    Rehab identified problem list/impairments: Rehab identified " problem list/impairments: weakness, impaired endurance, impaired self care skills, impaired balance, gait instability, impaired functional mobilty, decreased lower extremity function, decreased ROM, pain, impaired joint extensibility, orthopedic precautions, edema    Rehab potential is good.    Activity tolerance: Good    Discharge recommendations: Discharge Facility/Level Of Care Needs: home with home health     Barriers to discharge: Barriers to Discharge: None    Equipment recommendations: Equipment Needed After Discharge: none, other (see comments) (all DME in place)     GOALS:    Physical Therapy Goals        Problem: Physical Therapy Goal    Goal Priority Disciplines Outcome Goal Variances Interventions   Physical Therapy Goal     PT/OT, PT Ongoing (interventions implemented as appropriate)     Description:  Goals to be met by: 11/15/17     Patient will increase functional independence with mobility by performin. Supine to sit with Set-up [place]  2. Sit to supine with Set-up [place]  3. Sit to stand transfer with Supervision  4. Bed to chair transfer with Stand-by Assistance using Rolling Walker  5. Gait  x 100 feet with Stand-by Assistance using Rolling Walker.   6. Ascend/Descend 7 inch curb step with Contact Guard Assistance using Rolling Walker.  7. Lower extremity exercise program x20-30 reps per handout, with independence                        PLAN:    Patient to be seen BID to address the above listed problems via gait training, therapeutic activities, therapeutic exercises, neuromuscular re-education  Plan of Care expires: 17  Plan of Care reviewed with: patient, spouse          Avery Maya, PT  2017

## 2017-11-08 NOTE — PLAN OF CARE
Problem: Physical Therapy Goal  Goal: Physical Therapy Goal  Goals to be met by: 11/15/17     Patient will increase functional independence with mobility by performin. Supine to sit with Set-up [place]  2. Sit to supine with Set-up [place]  3. Sit to stand transfer with Supervision  4. Bed to chair transfer with Stand-by Assistance using Rolling Walker  5. Gait  x 100 feet with Stand-by Assistance using Rolling Walker.   6. Ascend/Descend 7 inch curb step with Contact Guard Assistance using Rolling Walker.  7. Lower extremity exercise program x20-30 reps per handout, with independence       Outcome: Outcome(s) achieved Date Met: 17  Pt tolerated session well this PM with no complications. Pt demonstrated improved stability with all functional tasks and was able to follow hip precautions and PWB status during mobility. Pt performed successful car t/f simulation with no difficulty. Pt safe to d/c home with HHPT at this time.

## 2017-11-08 NOTE — OP NOTE
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    PROCEDURE PERFORMED:  Revision left total hip arthroplasty.    SURGEON:  Jacky Baker M.D.    ASSISTANT:  Lionel Fierro M.D. (RES)    ANESTHESIA:  Spinal epidural.    SPECIMENS:  Explant and soft tissue.    FINDINGS:  Long left lower extremity.    URINE OUTPUT:  1550 mL.    FLUIDS:  4600 mL.    ESTIMATED BLOOD LOSS:  550 mL.    IMPLANTS:  Bubba size 8 high offset Secure-Fit stem with a +5, 28 mm Biolox   femoral head, a size 42E inner diameter 28 restoration ADM/MDM liner and MDM 42   mm liner.    INDICATIONS FOR PROCEDURE:  Ms. Lewis is a 62-year-old female who had a left   total hip arthroplasty performed in [place] on 05/24/2017.  She had   complaints of pain in the lower extremity and a long left lower extremity since   the time of surgery.  Workup was undertaken.  She was found to have a pelvic   obliquity, which contributed to the limb length inequality, but she was also   found it could be just over 1.5 cm long.  Treatment options were explained to   her and it was decided to proceed with revision left total hip arthroplasty.    She was aware of reasonable treatment options as well as risks and benefits.    She was aware that we may not be able to correct the limb length inequality   fully and there was a risk of dislocation following the surgery.  However, given   the amount of discomfort she was in and the affect of limb length equality she   was having on the quality of her life, it was decided to proceed with revision.    PROCEDURE IN DETAIL:  After appropriate consent was obtained, the patient was   brought in the Operating Room and anesthesia was administered.  She received   antibiotic prophylaxis.  Jorgensen catheter was sterilely inserted.  She was then   positioned in  lateral decubitus position with left hip pointed upward.  Axillary   roll was placed.  Bony prominences were well padded.  Pegboard positioning   system was utilized.  The left hip and lower extremity were then prepped and   draped in the usual sterile fashion.  Timeout was called.  She had two incisions   about the hip.  I opted to use the proximal 2/3 of the posterior incision.    This was taken down through the skin and tensor fascia.  The tensor fascia was   split in line with the skin incision and elevated posterior capsule off the   femur.  Tissue was sent for culture.  The hip was dislocated.  Some anterior   capsule was released.  The femoral head was removed.  The trunnion was   inspected.  It was in good condition.  The stem was extremely well fixed.    Attention was turned to the acetabulum.  It should be noted that a Charnley   retractor had been placed and the sciatic nerve was out of harm's way.  The   acetabulum was then exposed with the use of retractors.  Posterior capsule was   released.  Once again, the sciatic nerve was out of harm's way and with the use   of a screw, the polyethylene liner was easily removed.  The cup was well   positioned and well fixed.  We irrigated the area and inserted an MDM liner.    This was firmly seated.  We then packed this with a lap sponge to protect it and   then attention was then returned to the femur.  With the use of osteotomes and   a high-speed bur, we  the stem from the bone and then with the use of a   Shantel extraction device, the stem was removed with minimal bone loss.  I then   made a new neck cut a few millimeters above the lesser trochanter.  We needed to   reproduce the lesser trochanter to center of rotation distance of 50 mm, which   she had on the other side.  She was over 62 preoperatively, so we needed to   shorten the neck cut.  This was accomplished.  We then prepared the femur for a   Secure-Fit stem.  I reamed to an 8, broached to a  7, and inserted the 8 broach   for trial reduction.  A high offset stem was needed based on preoperative   templating and with a +5 head, I reproduced the lesser trochanter to center   distance on the other side, thus restoring the appropriate leg length.  A trial   reduction was performed.  She was stable in extremes of motion.  There was no   impingement.  There was no anterior instability.  There was no posterior   instability and leg lengths seemed well restored clinically as well as using the   measurement method, the hip was then dislocated.  We removed the broach.  I   then put one cable around the proximal femur as there was slight defect   anteriorly from where we removed the stem and I wanted to be sure there was no   propagation or crakes, so we secured a Smith and NephBreather cable.  This was well   positioned adjacent to the bone.  I was happy with its fit.  We then inserted   the 8 stem.  I was very satisfied with its position and fixation.  I remeasured   and once again with the +5 head would restore the leg length.  The head was   impacted onto a clean, dry trunnion.  This was the MDM construct.  The   acetabulum was inspected.  There was no loose body, foreign body or soft tissue   interposition.  The hip was then reduced and brought through range of motion.    It was stable as previously described.  It should be noted that also prior to   inserting the stem.  We did a thorough irrigation and debridement of the femoral   canal and all soft tissue to remove any metal debris.  This was done with   normal saline.  Once again once the hip was reduced, we were satisfied with the   stability and reproduction of the leg length.  The wound was irrigated with   dilute Betadine solution.  Following this, the incision was closed in layers.  I   used Ethibond to reapproximate the capsule proximally.  The tensor fascia was   then closed with #1 Vicryl.  Tranexamic acid was injected.  The remaining   incision was  closed with 0 Vicryl, 2-0 Vicryl and skin with Stratafix and   Dermabond.  Sterile dressing was applied.  The wound was periodically irrigated   with Betadine and saline throughout the closure.  She was turned supine.  Limb   length seemed very well reapproximated.  She was brought to the Recovery Room.    She is going to be placed in an abduction brace and kept partial weightbearing.    Once again, she tolerated the procedure well with no known complications.      NATALYA  dd: 11/07/2017 15:17:07 (CST)  td: 11/07/2017 19:56:13 (CST)  Doc ID   #[id number]  Job ID #[id number]    CC:

## 2017-11-10 ENCOUNTER — PATIENT MESSAGE (OUTPATIENT)
Dept: ORTHOPEDICS | Facility: CLINIC | Age: 62
End: 2017-11-10

## 2017-11-10 DIAGNOSIS — Z96.642 HISTORY OF LEFT HIP REPLACEMENT: Primary | ICD-10-CM

## 2017-11-10 RX ORDER — PROMETHAZINE HYDROCHLORIDE 25 MG/1
25 TABLET ORAL EVERY 8 HOURS PRN
Qty: 30 TABLET | Refills: 0 | Status: SHIPPED | OUTPATIENT
Start: 2017-11-10 | End: 2017-12-18

## 2017-11-10 RX ORDER — HYDROCODONE BITARTRATE AND ACETAMINOPHEN 10; 325 MG/1; MG/1
1 TABLET ORAL EVERY 4 HOURS PRN
Qty: 90 TABLET | Refills: 0 | Status: SHIPPED | OUTPATIENT
Start: 2017-11-10 | End: 2017-11-20

## 2017-11-10 NOTE — PROGRESS NOTES
Oxycodone making patient sick. Called in norco 10 as well as phenergan. Proper use discussed. Patient will call if she continues to have trouble with medication.

## 2017-11-11 LAB
BACTERIA SPEC AEROBE CULT: NO GROWTH
BACTERIA SPEC AEROBE CULT: NO GROWTH
BLD PROD TYP BPU: NORMAL
BLD PROD TYP BPU: NORMAL
BLOOD UNIT EXPIRATION DATE: NORMAL
BLOOD UNIT EXPIRATION DATE: NORMAL
BLOOD UNIT TYPE CODE: 5100
BLOOD UNIT TYPE CODE: 5100
BLOOD UNIT TYPE: NORMAL
BLOOD UNIT TYPE: NORMAL
CODING SYSTEM: NORMAL
CODING SYSTEM: NORMAL
DISPENSE STATUS: NORMAL
DISPENSE STATUS: NORMAL
TRANS ERYTHROCYTES VOL PATIENT: NORMAL ML
TRANS ERYTHROCYTES VOL PATIENT: NORMAL ML

## 2017-11-14 ENCOUNTER — PATIENT MESSAGE (OUTPATIENT)
Dept: ORTHOPEDICS | Facility: CLINIC | Age: 62
End: 2017-11-14

## 2017-11-14 LAB
BACTERIA SPEC ANAEROBE CULT: NORMAL
BACTERIA SPEC ANAEROBE CULT: NORMAL

## 2017-11-20 ENCOUNTER — OFFICE VISIT (OUTPATIENT)
Dept: ORTHOPEDICS | Facility: CLINIC | Age: 62
End: 2017-11-20
Payer: COMMERCIAL

## 2017-11-20 VITALS — WEIGHT: 169.75 LBS | HEIGHT: 64 IN | BODY MASS INDEX: 28.98 KG/M2

## 2017-11-20 DIAGNOSIS — Z96.642 HISTORY OF LEFT HIP REPLACEMENT: Primary | ICD-10-CM

## 2017-11-20 PROCEDURE — 99999 PR PBB SHADOW E&M-EST. PATIENT-LVL III: CPT | Mod: PBBFAC,,, | Performed by: PHYSICIAN ASSISTANT

## 2017-11-20 PROCEDURE — 99024 POSTOP FOLLOW-UP VISIT: CPT | Mod: S$GLB,,, | Performed by: PHYSICIAN ASSISTANT

## 2017-11-20 NOTE — PROGRESS NOTES
"Monet Lewis presents for initial post-operative visit following a revision left total hip arthroplasty performed by Dr. Baker on 11/7/2017.  In abduction brace, partial weight bearing. She is taking Norco bid. Patient is happy. Feels like leg length discrepancy has resolved.     Exam:  Height 5' 4" (1.626 m), weight 77 kg (169 lb 12.1 oz).     Incision is clean and dry without drainage or erythema.      Initial post-operative radiographs reviewed today revealing satisfactory position of the prosthesis.    A/P  2 weeks s/p revision left total hip replacement    - Patient advised to keep the incision clean and dry for the next 24 hours after which she  may wash the area with antibacterial soap in the shower, but is advised not to submerge until the incision is completely healed.  - Continue home PT. Will save outpatient visits until WB progressed.  - Continue partial WB and abduction brace.  - Continue ASA for 1 month from surgery.  - Follow up in 4 weeks with new x-rays. Pt will call clinic immediately with problems/concerns.   "

## 2017-12-14 LAB
FUNGUS SPEC CULT: NORMAL
FUNGUS SPEC CULT: NORMAL

## 2017-12-18 ENCOUNTER — OFFICE VISIT (OUTPATIENT)
Dept: ORTHOPEDICS | Facility: CLINIC | Age: 62
End: 2017-12-18
Payer: COMMERCIAL

## 2017-12-18 ENCOUNTER — HOSPITAL ENCOUNTER (OUTPATIENT)
Dept: RADIOLOGY | Facility: HOSPITAL | Age: 62
Discharge: HOME | End: 2017-12-18
Attending: PHYSICIAN ASSISTANT
Payer: COMMERCIAL

## 2017-12-18 DIAGNOSIS — M25.559 ARTHRALGIA OF HIP, UNSPECIFIED LATERALITY: ICD-10-CM

## 2017-12-18 DIAGNOSIS — Z96.642 HISTORY OF LEFT HIP REPLACEMENT: ICD-10-CM

## 2017-12-18 DIAGNOSIS — Z96.649 STATUS POST REVISION OF TOTAL HIP REPLACEMENT: Primary | ICD-10-CM

## 2017-12-18 PROCEDURE — 99999 PR PBB SHADOW E&M-EST. PATIENT-LVL III: CPT | Mod: PBBFAC,,, | Performed by: PHYSICIAN ASSISTANT

## 2017-12-18 PROCEDURE — 73502 X-RAY EXAM HIP UNI 2-3 VIEWS: CPT | Mod: TC,LT

## 2017-12-18 PROCEDURE — 73502 X-RAY EXAM HIP UNI 2-3 VIEWS: CPT | Mod: 26,LT,, | Performed by: RADIOLOGY

## 2017-12-18 PROCEDURE — 99024 POSTOP FOLLOW-UP VISIT: CPT | Mod: S$GLB,,, | Performed by: PHYSICIAN ASSISTANT

## 2017-12-18 NOTE — PROGRESS NOTES
Monet Lewis presents for 6 week post-operative visit following a revision left total hip arthroplasty performed by Dr. Baker on 11/7/2017.  In abduction brace, partial weight bearing. Having no pain. Off of pain medication. Feels like leg length discrepancy has been fixed.     Exam:  Incision is well healed without drainage or erythema.      New XR obtained today revealing satisfactory position of the prosthesis.    A/P  6 weeks s/p revision left total hip replacement  - Outpatient PT at [facility] in [place]  - May WBAT and out of abduction brace. Continue hip precautions.   - Follow up in 6 weeks with Dr. Baker. Pt will call clinic immediately with problems/concerns.

## 2017-12-19 ENCOUNTER — PATIENT MESSAGE (OUTPATIENT)
Dept: ORTHOPEDICS | Facility: CLINIC | Age: 62
End: 2017-12-19

## 2018-01-03 ENCOUNTER — PATIENT MESSAGE (OUTPATIENT)
Dept: ORTHOPEDICS | Facility: CLINIC | Age: 63
End: 2018-01-03

## 2018-01-09 LAB
ACID FAST MOD KINY STN SPEC: NORMAL
ACID FAST MOD KINY STN SPEC: NORMAL
MYCOBACTERIUM SPEC QL CULT: NORMAL
MYCOBACTERIUM SPEC QL CULT: NORMAL

## 2018-01-29 ENCOUNTER — OFFICE VISIT (OUTPATIENT)
Dept: ORTHOPEDICS | Facility: CLINIC | Age: 63
End: 2018-01-29
Payer: COMMERCIAL

## 2018-01-29 VITALS — HEIGHT: 64 IN | BODY MASS INDEX: 29.94 KG/M2 | WEIGHT: 175.38 LBS

## 2018-01-29 DIAGNOSIS — Z96.649 STATUS POST REVISION OF TOTAL HIP REPLACEMENT: Primary | ICD-10-CM

## 2018-01-29 DIAGNOSIS — M54.16 LUMBAR RADICULOPATHY: ICD-10-CM

## 2018-01-29 DIAGNOSIS — M95.5 PELVIC OBLIQUITY: ICD-10-CM

## 2018-01-29 DIAGNOSIS — M54.16 LUMBAR RADICULOPATHY, CHRONIC: ICD-10-CM

## 2018-01-29 PROCEDURE — 99024 POSTOP FOLLOW-UP VISIT: CPT | Mod: S$GLB,,, | Performed by: ORTHOPAEDIC SURGERY

## 2018-01-29 PROCEDURE — 99999 PR PBB SHADOW E&M-EST. PATIENT-LVL II: CPT | Mod: PBBFAC,,, | Performed by: ORTHOPAEDIC SURGERY

## 2018-01-29 NOTE — PROGRESS NOTES
"Subjective:      Patient ID: Monet Lewis is a 62 y.o. female.    Chief Complaint: Post-op Evaluation of the Left Hip    HPI  Monet Lewis has left hip pain.  The pain is slightly improved. The pain is located in the SI with radiation to the buttock.  Rare groin pain.     The pain is described as achy. The pain is aggravated by ambulating.  It is alleviated by rest.  There is associated back pain.  She stopped going to PTHer history, medications and problem list were reviewed.    Review of Systems   Constitution: Negative for chills, fever and night sweats.   HENT: Negative for hearing loss.    Eyes: Negative for blurred vision and double vision.   Cardiovascular: Negative for chest pain, claudication and leg swelling.   Respiratory: Negative for shortness of breath.    Endocrine: Negative for polydipsia, polyphagia and polyuria.   Hematologic/Lymphatic: Negative for adenopathy and bleeding problem. Does not bruise/bleed easily.   Skin: Negative for poor wound healing.   Musculoskeletal: Positive for back pain and joint pain.   Gastrointestinal: Negative for diarrhea and heartburn.   Genitourinary: Negative for bladder incontinence.   Neurological: Negative for focal weakness, headaches, numbness, paresthesias and sensory change.   Psychiatric/Behavioral: The patient is not nervous/anxious.    Allergic/Immunologic: Negative for persistent infections.         Objective:      Body mass index is 30.1 kg/m².  Vitals:    01/29/18 0956   Weight: 79.5 kg (175 lb 6 oz)   Height: 5' 4" (1.626 m)           General    Constitutional: She is oriented to person, place, and time. She appears well-developed and well-nourished.   HENT:   Head: Normocephalic and atraumatic.   Eyes: EOM are normal.   Cardiovascular: Normal rate.    Pulmonary/Chest: Effort normal.   Neurological: She is alert and oriented to person, place, and time.   Psychiatric: She has a normal mood and affect. Her behavior is normal.     General " Musculoskeletal Exam   Gait: abnormal   Pelvic Obliquity: moderate      Right Knee Exam     Inspection   Alignment:  normal  Effusion: effusion    Left Knee Exam     Inspection   Alignment:  normal  Effusion: absent    Right Hip Exam     Inspection   Scars: absent  Swelling: absent  Bruising: absent  No deformity of hip.  Quadriceps Atrophy:  Negative  Erythema: absent    Range of Motion   Extension: 0   Flexion: 100   Internal Rotation: 25   External Rotation: 30   Abduction: 25   Adduction: 20     Tests   Pain w/ forced internal rotation (JUDAH): absent  Stinchfield test: negative    Other   Sensation: normal  Left Hip Exam     Inspection   Scars: present  Swelling: absent  No deformity of hip.  Quadriceps Atrophy:  negative  Erythema: absent  Bruising: absent    Range of Motion   Extension: 0   Flexion: 100   Internal Rotation: 25   External Rotation: 30   Abduction: 25   Adduction: 20     Tests   Pain w/ forced internal rotation (JUDAH): present  Stinchfield test: negative    Other   Sensation: normal    Comments:  Tight IT band    She walks with a waddling gait.      Back (L-Spine & T-Spine) / Neck (C-Spine) Exam     Back (L-Spine & T-Spine) Range of Motion   The patient has abnormal back ROM.      Muscle Strength   Right Lower Extremity   Hip Abduction: 5/5   Hip Adduction: 5/5   Hip Flexion: 5/5   Ankle Dorsiflexion:  5/5   Left Lower Extremity   Hip Abduction: 5/5   Hip Adduction: 5/5   Hip Flexion: 5/5   Ankle Dorsiflexion:  5/5     Reflexes     Left Side  Quadriceps:  2+    Right Side   Quadriceps:  2+    Vascular Exam     Right Pulses  Dorsalis Pedis:      2+          Left Pulses  Dorsalis Pedis:      2+          Capillary Refill  Right Hand: normal capillary refill  Left Hand: normal capillary refill    Edema  Right Upper Leg: absent  Left Upper Leg: absent              Assessment:       Encounter Diagnoses   Name Primary?    Status post revision of total hip replacement left 11/7/2017 Yes    Lumbar  radiculopathy     Pelvic obliquity     Lumbar radiculopathy, chronic           Plan:       Monet was seen today for post-op evaluation.    Diagnoses and all orders for this visit:    Status post revision of total hip replacement left 11/7/2017    Lumbar radiculopathy    Pelvic obliquity    Lumbar radiculopathy, chronic  -     MRI Lumbar Spine Without Contrast; Future      Options discussed.  She has a tight IT band and some lumbar radicular symptoms. She will need further PT and a lumbar MRI.  Recent kidney stone.  On antibiotics. Will hold off on dental work for now.  F/U in 6 weeks.

## 2018-01-31 ENCOUNTER — PATIENT MESSAGE (OUTPATIENT)
Dept: ORTHOPEDICS | Facility: CLINIC | Age: 63
End: 2018-01-31

## 2018-02-01 DIAGNOSIS — M95.5 PELVIC OBLIQUITY: ICD-10-CM

## 2018-02-01 DIAGNOSIS — M54.16 LUMBAR RADICULOPATHY: ICD-10-CM

## 2018-02-01 DIAGNOSIS — Z96.649 STATUS POST REVISION OF TOTAL HIP REPLACEMENT: Primary | ICD-10-CM

## 2018-02-05 ENCOUNTER — HOSPITAL ENCOUNTER (OUTPATIENT)
Dept: RADIOLOGY | Facility: HOSPITAL | Age: 63
Discharge: HOME | End: 2018-02-05
Attending: ORTHOPAEDIC SURGERY
Payer: COMMERCIAL

## 2018-02-05 DIAGNOSIS — M54.16 LUMBAR RADICULOPATHY, CHRONIC: ICD-10-CM

## 2018-02-05 PROCEDURE — 72148 MRI LUMBAR SPINE W/O DYE: CPT | Mod: 26,,, | Performed by: RADIOLOGY

## 2018-02-05 PROCEDURE — 72148 MRI LUMBAR SPINE W/O DYE: CPT | Mod: TC

## 2018-02-06 ENCOUNTER — TELEPHONE (OUTPATIENT)
Dept: ORTHOPEDICS | Facility: CLINIC | Age: 63
End: 2018-02-06

## 2018-02-06 NOTE — TELEPHONE ENCOUNTER
----- Message from Jacky Baker MD sent at 2/6/2018 11:26 AM CST -----  Please call pt.  MRI of her spine did not show any significant abnormalty

## 2018-02-06 NOTE — TELEPHONE ENCOUNTER
Spoke to pt and she was notified of her results and she understood. Pt will keep her upcoming appointment.

## 2018-02-23 ENCOUNTER — PATIENT MESSAGE (OUTPATIENT)
Dept: ORTHOPEDICS | Facility: CLINIC | Age: 63
End: 2018-02-23

## 2018-03-02 ENCOUNTER — PATIENT MESSAGE (OUTPATIENT)
Dept: ORTHOPEDICS | Facility: CLINIC | Age: 63
End: 2018-03-02

## 2018-03-19 ENCOUNTER — OFFICE VISIT (OUTPATIENT)
Dept: ORTHOPEDICS | Facility: CLINIC | Age: 63
End: 2018-03-19
Payer: COMMERCIAL

## 2018-03-19 VITALS — BODY MASS INDEX: 28.78 KG/M2 | WEIGHT: 168.56 LBS | HEIGHT: 64 IN

## 2018-03-19 DIAGNOSIS — Z96.649 STATUS POST REVISION OF TOTAL HIP REPLACEMENT: Primary | ICD-10-CM

## 2018-03-19 PROCEDURE — 99213 OFFICE O/P EST LOW 20 MIN: CPT | Mod: S$GLB,,, | Performed by: ORTHOPAEDIC SURGERY

## 2018-03-19 PROCEDURE — 99999 PR PBB SHADOW E&M-EST. PATIENT-LVL III: CPT | Mod: PBBFAC,,, | Performed by: ORTHOPAEDIC SURGERY

## 2018-03-19 RX ORDER — IBANDRONATE SODIUM 150 MG/1
TABLET, FILM COATED ORAL
Refills: 3 | COMMUNITY
Start: 2018-02-17

## 2018-03-19 RX ORDER — AMOXICILLIN 500 MG/1
TABLET, FILM COATED ORAL
Qty: 4 TABLET | Refills: 3 | Status: SHIPPED | OUTPATIENT
Start: 2018-03-19

## 2018-03-19 RX ORDER — ASPIRIN 81 MG/1
81 TABLET ORAL
COMMUNITY

## 2018-03-19 NOTE — PROGRESS NOTES
"Subjective:      Patient ID: Monet Lewis is a 63 y.o. female.    Chief Complaint: Pain of the Left Hip    HPI  Monet Lewis has  left hip pain.  The pain has improved. The pain is located in the buttock.  There  is radiation.  The pain radaites to the groin.   The pain is described as achy. The pain is aggravated by walking.  It is alleviated by rest.  There is not associated back pain. She is in PT and believes she is getting stronger.  Her history, medications and problem list were reviewed.    Review of Systems   Constitution: Negative for chills, fever and night sweats.   HENT: Negative for hearing loss.    Eyes: Negative for blurred vision and double vision.   Cardiovascular: Negative for chest pain, claudication and leg swelling.   Respiratory: Negative for shortness of breath.    Endocrine: Negative for polydipsia, polyphagia and polyuria.   Hematologic/Lymphatic: Negative for adenopathy and bleeding problem. Does not bruise/bleed easily.   Skin: Negative for poor wound healing.   Musculoskeletal: Positive for joint pain.   Gastrointestinal: Negative for diarrhea and heartburn.   Genitourinary: Negative for bladder incontinence.   Neurological: Negative for focal weakness, headaches, numbness, paresthesias and sensory change.   Psychiatric/Behavioral: The patient is not nervous/anxious.    Allergic/Immunologic: Negative for persistent infections.         Objective:      Body mass index is 28.93 kg/m².  Vitals:    03/19/18 1133   Weight: 76.5 kg (168 lb 8.7 oz)   Height: 5' 4" (1.626 m)           General    Constitutional: She is oriented to person, place, and time. She appears well-developed and well-nourished.   HENT:   Head: Normocephalic and atraumatic.   Eyes: EOM are normal.   Cardiovascular: Normal rate.    Pulmonary/Chest: Effort normal.   Neurological: She is alert and oriented to person, place, and time.   Psychiatric: She has a normal mood and affect. Her behavior is normal.     General " Musculoskeletal Exam   Gait: antalgic and Trendelenburg   Pelvic Obliquity: none      Right Knee Exam     Inspection   Alignment:  normal  Effusion: effusion    Left Knee Exam     Inspection   Alignment:  normal  Effusion: absent    Right Hip Exam     Inspection   Scars: absent  Swelling: absent  Bruising: absent  No deformity of hip.  Quadriceps Atrophy:  Negative  Erythema: absent    Range of Motion   Extension: 0   Flexion: 100   Internal Rotation: 25   External Rotation: 30   Abduction: 25   Adduction: 20     Tests   Pain w/ forced internal rotation (JUDAH): absent  Stinchfield test: negative    Other   Sensation: normal  Left Hip Exam     Inspection   Scars: present  Swelling: absent  No deformity of hip.  Quadriceps Atrophy:  negative  Erythema: absent  Bruising: absent    Range of Motion   Extension: 0   Flexion: 100   Internal Rotation: 25   External Rotation: 30   Abduction: 25   Adduction: 20     Tests   Pain w/ forced internal rotation (JUDAH): absent  Stinchfield test: negative    Other   Sensation: normal      Back (L-Spine & T-Spine) / Neck (C-Spine) Exam   Back exam is normal.      Muscle Strength   Right Lower Extremity   Hip Abduction: 5/5   Hip Adduction: 5/5   Hip Flexion: 5/5   Ankle Dorsiflexion:  5/5   Left Lower Extremity   Hip Abduction: 4/5   Hip Adduction: 5/5   Hip Flexion: 5/5   Ankle Dorsiflexion:  5/5     Reflexes     Left Side  Quadriceps:  2+    Right Side   Quadriceps:  2+    Vascular Exam     Right Pulses  Dorsalis Pedis:      2+          Left Pulses  Dorsalis Pedis:      2+          Capillary Refill  Right Hand: normal capillary refill  Left Hand: normal capillary refill    Edema  Right Upper Leg: absent  Left Upper Leg: absent              Assessment:       Encounter Diagnosis   Name Primary?   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    Diagnoses and all orders for this visit:    Status post revision  of total hip replacement left 11/7/2017    Other orders  -     amoxicillin (AMOXIL) 500 MG Tab; Take 4 pills one hour prior to dental work      Continue PT.  She has improved since last visit and I expect this to continue.    Amoxil for dental work.  F/U in 2 months with left hip xrays

## 2018-04-23 ENCOUNTER — PATIENT MESSAGE (OUTPATIENT)
Dept: ORTHOPEDICS | Facility: CLINIC | Age: 63
End: 2018-04-23

## 2018-04-23 DIAGNOSIS — Z96.649 STATUS POST REVISION OF TOTAL HIP REPLACEMENT: Primary | ICD-10-CM

## 2018-05-17 DIAGNOSIS — M25.552 PAIN OF LEFT HIP JOINT: Primary | ICD-10-CM

## 2018-05-28 ENCOUNTER — TELEPHONE (OUTPATIENT)
Dept: ORTHOPEDICS | Facility: CLINIC | Age: 63
End: 2018-05-28

## 2018-06-18 ENCOUNTER — OFFICE VISIT (OUTPATIENT)
Dept: ORTHOPEDICS | Facility: CLINIC | Age: 63
End: 2018-06-18
Payer: COMMERCIAL

## 2018-06-18 ENCOUNTER — HOSPITAL ENCOUNTER (OUTPATIENT)
Dept: RADIOLOGY | Facility: HOSPITAL | Age: 63
Discharge: HOME | End: 2018-06-18
Attending: ORTHOPAEDIC SURGERY
Payer: COMMERCIAL

## 2018-06-18 VITALS — HEIGHT: 64 IN | BODY MASS INDEX: 28.31 KG/M2 | WEIGHT: 165.81 LBS

## 2018-06-18 DIAGNOSIS — M25.552 PAIN OF LEFT HIP JOINT: ICD-10-CM

## 2018-06-18 DIAGNOSIS — Z96.649 STATUS POST REVISION OF TOTAL HIP REPLACEMENT: Primary | ICD-10-CM

## 2018-06-18 PROCEDURE — 99999 PR PBB SHADOW E&M-EST. PATIENT-LVL II: CPT | Mod: PBBFAC,,, | Performed by: ORTHOPAEDIC SURGERY

## 2018-06-18 PROCEDURE — 3008F BODY MASS INDEX DOCD: CPT | Mod: CPTII,S$GLB,, | Performed by: ORTHOPAEDIC SURGERY

## 2018-06-18 PROCEDURE — 99213 OFFICE O/P EST LOW 20 MIN: CPT | Mod: S$GLB,,, | Performed by: ORTHOPAEDIC SURGERY

## 2018-06-18 PROCEDURE — 73502 X-RAY EXAM HIP UNI 2-3 VIEWS: CPT | Mod: TC,LT

## 2018-06-18 PROCEDURE — 73502 X-RAY EXAM HIP UNI 2-3 VIEWS: CPT | Mod: 26,LT,, | Performed by: RADIOLOGY

## 2018-06-18 NOTE — PROGRESS NOTES
"Subjective:      Patient ID: Monet Lewis is a 63 y.o. female.    Chief Complaint: Pain of the Left Hip    HPI  Monet Lewis has left hip pain.  The pain ishas improved. The pain is located in the groin.  There  is not radiation.   The pain is described as a heaviness. The pain is aggravated by activity.  It is alleviated by rest.  Her limp is sunjectively improving  Her history, medications and problem list were reviewed.    Review of Systems   Constitution: Negative for chills, fever and night sweats.   HENT: Negative for hearing loss.    Eyes: Negative for blurred vision and double vision.   Cardiovascular: Negative for chest pain, claudication and leg swelling.   Respiratory: Negative for shortness of breath.    Endocrine: Negative for polydipsia, polyphagia and polyuria.   Hematologic/Lymphatic: Negative for adenopathy and bleeding problem. Does not bruise/bleed easily.   Skin: Negative for poor wound healing.   Musculoskeletal: Positive for joint pain.   Gastrointestinal: Negative for diarrhea and heartburn.   Genitourinary: Negative for bladder incontinence.   Neurological: Negative for focal weakness, headaches, numbness, paresthesias and sensory change.   Psychiatric/Behavioral: The patient is not nervous/anxious.    Allergic/Immunologic: Negative for persistent infections.         Objective:      Body mass index is 28.46 kg/m².  Vitals:    06/18/18 1305   Weight: 75.2 kg (165 lb 12.6 oz)   Height: 5' 4" (1.626 m)           General    Constitutional: She is oriented to person, place, and time. She appears well-developed and well-nourished.   HENT:   Head: Normocephalic and atraumatic.   Eyes: EOM are normal.   Cardiovascular: Normal rate.    Pulmonary/Chest: Effort normal.   Neurological: She is alert and oriented to person, place, and time.   Psychiatric: She has a normal mood and affect. Her behavior is normal.     General Musculoskeletal Exam   Gait: abnormal   Pelvic Obliquity: none      Right Knee " Exam     Inspection   Alignment:  normal  Effusion: effusion    Left Knee Exam     Inspection   Alignment:  normal  Effusion: absent    Right Hip Exam     Inspection   Scars: absent  Swelling: absent  Bruising: absent  No deformity of hip.  Quadriceps Atrophy:  Negative  Erythema: absent    Range of Motion   Extension: 0   Flexion: 100   Internal Rotation: 25   External Rotation: 30   Abduction: 25   Adduction: 20     Tests   Pain w/ forced internal rotation (JUDAH): absent  Stinchfield test: negative    Other   Sensation: normal  Left Hip Exam     Inspection   Scars: present  Swelling: absent  No deformity of hip.  Quadriceps Atrophy:  negative  Erythema: absent  Bruising: absent    Range of Motion   Extension: 0   Flexion: 90   Internal Rotation: 20   External Rotation: 20   Abduction: 20   Adduction: 15     Tests   Pain w/ forced internal rotation (JUDAH): absent  Stinchfield test: negative    Other   Sensation: normal      Back (L-Spine & T-Spine) / Neck (C-Spine) Exam   Back exam is normal.      Muscle Strength   Right Lower Extremity   Hip Abduction: 5/5   Hip Adduction: 5/5   Hip Flexion: 5/5   Ankle Dorsiflexion:  5/5   Left Lower Extremity   Hip Abduction: 5/5   Hip Adduction: 5/5   Hip Flexion: 5/5   Ankle Dorsiflexion:  5/5     Reflexes     Left Side  Quadriceps:  2+    Right Side   Quadriceps:  2+    Vascular Exam     Right Pulses  Dorsalis Pedis:      2+          Left Pulses  Dorsalis Pedis:      2+          Capillary Refill  Right Hand: normal capillary refill  Left Hand: normal capillary refill    Edema  Right Upper Leg: absent  Left Upper Leg: absent    Radiographs taken today were reviewed by me.  There is a prosthetic replacement of the left hip(s).  The prosthesis is well positioned.  There is not evidence of bone loss, osteolysis, or loosening. There is not a fracture.              Assessment:       Encounter Diagnoses   Name Primary?    Status post revision of total hip replacement left  11/7/2017 Yes    Pain of left hip joint           Plan:       Monet was seen today for pain.    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  F/U 5 months with xrays and PROMS. Sooner if needed.

## 2018-06-19 ENCOUNTER — PATIENT MESSAGE (OUTPATIENT)
Dept: ORTHOPEDICS | Facility: CLINIC | Age: 63
End: 2018-06-19

## 2018-06-23 ENCOUNTER — PATIENT MESSAGE (OUTPATIENT)
Dept: ORTHOPEDICS | Facility: CLINIC | Age: 63
End: 2018-06-23

## 2018-08-16 ENCOUNTER — PATIENT MESSAGE (OUTPATIENT)
Dept: ORTHOPEDICS | Facility: CLINIC | Age: 63
End: 2018-08-16

## 2018-12-20 DIAGNOSIS — M25.552 PAIN OF LEFT HIP JOINT: Primary | ICD-10-CM

## 2018-12-31 ENCOUNTER — HOSPITAL ENCOUNTER (OUTPATIENT)
Dept: RADIOLOGY | Facility: HOSPITAL | Age: 63
Discharge: HOME | End: 2018-12-31
Attending: ORTHOPAEDIC SURGERY
Payer: COMMERCIAL

## 2018-12-31 ENCOUNTER — OFFICE VISIT (OUTPATIENT)
Dept: ORTHOPEDICS | Facility: CLINIC | Age: 63
End: 2018-12-31
Payer: COMMERCIAL

## 2018-12-31 VITALS — WEIGHT: 169.88 LBS | BODY MASS INDEX: 29.16 KG/M2

## 2018-12-31 DIAGNOSIS — M25.552 PAIN OF LEFT HIP JOINT: ICD-10-CM

## 2018-12-31 DIAGNOSIS — Z96.649 STATUS POST REVISION OF TOTAL HIP REPLACEMENT: Primary | ICD-10-CM

## 2018-12-31 PROCEDURE — 73502 XR HIP 2 VIEW LEFT: ICD-10-PCS | Mod: 26,LT,, | Performed by: RADIOLOGY

## 2018-12-31 PROCEDURE — 99999 PR PBB SHADOW E&M-EST. PATIENT-LVL II: CPT | Mod: PBBFAC,,, | Performed by: ORTHOPAEDIC SURGERY

## 2018-12-31 PROCEDURE — 73502 X-RAY EXAM HIP UNI 2-3 VIEWS: CPT | Mod: TC,LT

## 2018-12-31 PROCEDURE — 3008F BODY MASS INDEX DOCD: CPT | Mod: S$GLB,,, | Performed by: ORTHOPAEDIC SURGERY

## 2018-12-31 PROCEDURE — 73502 X-RAY EXAM HIP UNI 2-3 VIEWS: CPT | Mod: 26,LT,, | Performed by: RADIOLOGY

## 2018-12-31 PROCEDURE — 99212 OFFICE O/P EST SF 10 MIN: CPT | Mod: S$GLB,,, | Performed by: ORTHOPAEDIC SURGERY

## 2018-12-31 NOTE — PROGRESS NOTES
Monet Lewis is in for one year follow up for a left NAYANA revision.  She is doing well.  No pain in the hip.  She has resumed activities of daily living.      Exam demonstrates  A well developed female in no distress.  Alert and oriented.  Mood and affect are appropriate.    Hip incision is well healed.  There is a good, stable range of motion and leg lengths are clinically equal.  The extremity is neurovascularly intact.    Xrays demonstrate a well fixed and positioned prosthesis.    Imp:DOing well      F/u in one year with xrays

## 2020-03-12 ENCOUNTER — PATIENT MESSAGE (OUTPATIENT)
Dept: ORTHOPEDICS | Facility: CLINIC | Age: 65
End: 2020-03-12

## 2020-07-13 DIAGNOSIS — M25.552 PAIN OF LEFT HIP JOINT: Primary | ICD-10-CM

## 2020-09-21 ENCOUNTER — HOSPITAL ENCOUNTER (OUTPATIENT)
Dept: RADIOLOGY | Facility: HOSPITAL | Age: 65
Discharge: HOME | End: 2020-09-21
Attending: ORTHOPAEDIC SURGERY
Payer: COMMERCIAL

## 2020-09-21 ENCOUNTER — OFFICE VISIT (OUTPATIENT)
Dept: ORTHOPEDICS | Facility: CLINIC | Age: 65
End: 2020-09-21
Payer: COMMERCIAL

## 2020-09-21 VITALS — BODY MASS INDEX: 30.58 KG/M2 | HEIGHT: 64 IN | WEIGHT: 179.13 LBS

## 2020-09-21 DIAGNOSIS — Z96.649 STATUS POST REVISION OF TOTAL HIP REPLACEMENT: ICD-10-CM

## 2020-09-21 DIAGNOSIS — M25.552 PAIN OF LEFT HIP JOINT: ICD-10-CM

## 2020-09-21 DIAGNOSIS — M25.552 PAIN OF LEFT HIP JOINT: Primary | ICD-10-CM

## 2020-09-21 PROCEDURE — 99999 PR PBB SHADOW E&M-EST. PATIENT-LVL III: CPT | Mod: PBBFAC,,, | Performed by: ORTHOPAEDIC SURGERY

## 2020-09-21 PROCEDURE — 73502 X-RAY EXAM HIP UNI 2-3 VIEWS: CPT | Mod: 26,LT,, | Performed by: INTERNAL MEDICINE

## 2020-09-21 PROCEDURE — 73502 X-RAY EXAM HIP UNI 2-3 VIEWS: CPT | Mod: TC,LT

## 2020-09-21 PROCEDURE — 99212 OFFICE O/P EST SF 10 MIN: CPT | Mod: S$GLB,,, | Performed by: ORTHOPAEDIC SURGERY

## 2020-09-21 PROCEDURE — 99999 PR PBB SHADOW E&M-EST. PATIENT-LVL III: ICD-10-PCS | Mod: PBBFAC,,, | Performed by: ORTHOPAEDIC SURGERY

## 2020-09-21 PROCEDURE — 73502 XR HIP 2 VIEW LEFT: ICD-10-PCS | Mod: 26,LT,, | Performed by: INTERNAL MEDICINE

## 2020-09-21 PROCEDURE — 99212 PR OFFICE/OUTPT VISIT, EST, LEVL II, 10-19 MIN: ICD-10-PCS | Mod: S$GLB,,, | Performed by: ORTHOPAEDIC SURGERY

## 2020-09-21 PROCEDURE — 3008F BODY MASS INDEX DOCD: CPT | Mod: S$GLB,,, | Performed by: ORTHOPAEDIC SURGERY

## 2020-09-21 PROCEDURE — 3008F PR BODY MASS INDEX (BMI) DOCUMENTED: ICD-10-PCS | Mod: S$GLB,,, | Performed by: ORTHOPAEDIC SURGERY

## 2020-09-21 NOTE — PROGRESS NOTES
Monet Lewis is in for 3 year follow up for a left revision NAYANA.  She is doing well.  Mild pain in the hip.  She has resumed activities of daily living.      Exam demonstrates  A well developed female in no distress.  Alert and oriented.  Mood and affect are appropriate.    Hip incision is well healed.  There is a good, stable range of motion and leg lengths are clinically equal.  The extremity is neurovascularly intact.    Xrays demonstrate a well fixed and positioned prosthesis.    Imp:Doing well  OTC NSAID prn.  F/u in one year with xrays ad PROMS.  Sooner if needed

## 2020-10-14 ENCOUNTER — TELEPHONE (OUTPATIENT)
Dept: ORTHOPEDICS | Facility: CLINIC | Age: 65
End: 2020-10-14

## 2023-05-29 ENCOUNTER — TELEPHONE (OUTPATIENT)
Dept: ORTHOPEDICS | Facility: CLINIC | Age: 68
End: 2023-05-29
Payer: COMMERCIAL

## 2023-05-29 NOTE — TELEPHONE ENCOUNTER
Spoke to pt and she was content with current appointment. Pt was worried insurance would not cover but she will call back if she hears otherwise.

## 2024-02-26 ENCOUNTER — TELEPHONE (OUTPATIENT)
Dept: ORTHOPEDICS | Facility: CLINIC | Age: 69
End: 2024-02-26
Payer: COMMERCIAL

## 2024-02-26 NOTE — TELEPHONE ENCOUNTER
Called pt and stated that she can call medical records to request these records. Pt verbalized understanding and has no further questions.    ----- Message from Tiara Olea sent at 2/26/2024  2:42 PM CST -----  Regarding: pt advice  Contact: [phone]  Pt asking to have records faxed to her new provider. Dr Contreras, fax#[phone]. Pt stated she has an appt tomorrow for 1 and would like the records sent today or tomorrow morning. Pls call to discuss.

## (undated) DEVICE — IMMOB KNEE UNIV TRI PANEL 19IN

## (undated) DEVICE — TAPE SURG DURAPORE 2 X10YD

## (undated) DEVICE — HOOD T-5 TEAR AWAY STERILE

## (undated) DEVICE — ELECTRODE REM PLYHSV RETURN 9

## (undated) DEVICE — SEE MEDLINE ITEM 157131

## (undated) DEVICE — Device

## (undated) DEVICE — IMPLANTABLE DEVICE
Type: IMPLANTABLE DEVICE | Site: HIP | Status: NON-FUNCTIONAL
Removed: 2017-11-07

## (undated) DEVICE — SEE L#156916

## (undated) DEVICE — NDL 18GA X1 1/2 REG BEVEL

## (undated) DEVICE — SEE MEDLINE ITEM 154981

## (undated) DEVICE — SUT VICRYL PLUS 2-0 CT1 18

## (undated) DEVICE — SOL IRR NACL .9% 3000ML

## (undated) DEVICE — SEE MEDLINE ITEM 152487

## (undated) DEVICE — KIT IRR SUCTION HND PIECE

## (undated) DEVICE — ADHESIVE DERMABOND ADVANCED

## (undated) DEVICE — TRAY FOLEY 16FR INFECTION CONT

## (undated) DEVICE — SUT VICRYL PLUS 0 CT1 18IN

## (undated) DEVICE — MASK FLYTE HOOD PEEL AWAY

## (undated) DEVICE — PILLOW ABDUCTION FOAM MED

## (undated) DEVICE — SEE MEDLINE ITEM 146292

## (undated) DEVICE — DRESSING AQUACEL AG ADV 3.5X6

## (undated) DEVICE — SUT VICRYL+ 1 CT1 18IN

## (undated) DEVICE — DRESSING COVER AQUACEL AG SURG